# Patient Record
Sex: MALE | Race: OTHER | NOT HISPANIC OR LATINO | URBAN - METROPOLITAN AREA
[De-identification: names, ages, dates, MRNs, and addresses within clinical notes are randomized per-mention and may not be internally consistent; named-entity substitution may affect disease eponyms.]

---

## 2022-06-26 ENCOUNTER — INPATIENT (INPATIENT)
Facility: HOSPITAL | Age: 67
LOS: 2 days | Discharge: HOME | DRG: 638 | End: 2022-06-29
Attending: STUDENT IN AN ORGANIZED HEALTH CARE EDUCATION/TRAINING PROGRAM | Admitting: STUDENT IN AN ORGANIZED HEALTH CARE EDUCATION/TRAINING PROGRAM
Payer: COMMERCIAL

## 2022-06-26 VITALS
SYSTOLIC BLOOD PRESSURE: 117 MMHG | OXYGEN SATURATION: 98 % | RESPIRATION RATE: 18 BRPM | HEART RATE: 98 BPM | TEMPERATURE: 98 F | DIASTOLIC BLOOD PRESSURE: 78 MMHG

## 2022-06-26 DIAGNOSIS — E11.65 TYPE 2 DIABETES MELLITUS WITH HYPERGLYCEMIA: ICD-10-CM

## 2022-06-26 DIAGNOSIS — N17.9 ACUTE KIDNEY FAILURE, UNSPECIFIED: ICD-10-CM

## 2022-06-26 DIAGNOSIS — N18.9 CHRONIC KIDNEY DISEASE, UNSPECIFIED: ICD-10-CM

## 2022-06-26 DIAGNOSIS — Z90.79 ACQUIRED ABSENCE OF OTHER GENITAL ORGAN(S): Chronic | ICD-10-CM

## 2022-06-26 DIAGNOSIS — C61 MALIGNANT NEOPLASM OF PROSTATE: ICD-10-CM

## 2022-06-26 DIAGNOSIS — Z98.890 OTHER SPECIFIED POSTPROCEDURAL STATES: Chronic | ICD-10-CM

## 2022-06-26 DIAGNOSIS — E83.39 OTHER DISORDERS OF PHOSPHORUS METABOLISM: ICD-10-CM

## 2022-06-26 DIAGNOSIS — J30.2 OTHER SEASONAL ALLERGIC RHINITIS: ICD-10-CM

## 2022-06-26 DIAGNOSIS — Z92.3 PERSONAL HISTORY OF IRRADIATION: ICD-10-CM

## 2022-06-26 DIAGNOSIS — R74.8 ABNORMAL LEVELS OF OTHER SERUM ENZYMES: ICD-10-CM

## 2022-06-26 DIAGNOSIS — Z90.79 ACQUIRED ABSENCE OF OTHER GENITAL ORGAN(S): ICD-10-CM

## 2022-06-26 DIAGNOSIS — R63.8 OTHER SYMPTOMS AND SIGNS CONCERNING FOOD AND FLUID INTAKE: ICD-10-CM

## 2022-06-26 DIAGNOSIS — F41.9 ANXIETY DISORDER, UNSPECIFIED: ICD-10-CM

## 2022-06-26 DIAGNOSIS — Z92.21 PERSONAL HISTORY OF ANTINEOPLASTIC CHEMOTHERAPY: ICD-10-CM

## 2022-06-26 DIAGNOSIS — Z91.14 PATIENT'S OTHER NONCOMPLIANCE WITH MEDICATION REGIMEN: ICD-10-CM

## 2022-06-26 DIAGNOSIS — I12.9 HYPERTENSIVE CHRONIC KIDNEY DISEASE WITH STAGE 1 THROUGH STAGE 4 CHRONIC KIDNEY DISEASE, OR UNSPECIFIED CHRONIC KIDNEY DISEASE: ICD-10-CM

## 2022-06-26 DIAGNOSIS — D64.9 ANEMIA, UNSPECIFIED: ICD-10-CM

## 2022-06-26 DIAGNOSIS — E83.42 HYPOMAGNESEMIA: ICD-10-CM

## 2022-06-26 DIAGNOSIS — J45.909 UNSPECIFIED ASTHMA, UNCOMPLICATED: ICD-10-CM

## 2022-06-26 DIAGNOSIS — E87.1 HYPO-OSMOLALITY AND HYPONATREMIA: ICD-10-CM

## 2022-06-26 DIAGNOSIS — E11.22 TYPE 2 DIABETES MELLITUS WITH DIABETIC CHRONIC KIDNEY DISEASE: ICD-10-CM

## 2022-06-26 DIAGNOSIS — I10 ESSENTIAL (PRIMARY) HYPERTENSION: ICD-10-CM

## 2022-06-26 DIAGNOSIS — E86.0 DEHYDRATION: ICD-10-CM

## 2022-06-26 DIAGNOSIS — K60.4 RECTAL FISTULA: Chronic | ICD-10-CM

## 2022-06-26 DIAGNOSIS — Z85.46 PERSONAL HISTORY OF MALIGNANT NEOPLASM OF PROSTATE: ICD-10-CM

## 2022-06-26 LAB
A1C WITH ESTIMATED AVERAGE GLUCOSE RESULT: 18.8 % — HIGH (ref 4–5.6)
ALBUMIN SERPL ELPH-MCNC: 3.7 G/DL — SIGNIFICANT CHANGE UP (ref 3.3–5)
ALBUMIN SERPL ELPH-MCNC: 4 G/DL — SIGNIFICANT CHANGE UP (ref 3.3–5)
ALP SERPL-CCNC: 83 U/L — SIGNIFICANT CHANGE UP (ref 40–120)
ALP SERPL-CCNC: 92 U/L — SIGNIFICANT CHANGE UP (ref 40–120)
ALT FLD-CCNC: 9 U/L — LOW (ref 10–45)
ALT FLD-CCNC: 9 U/L — LOW (ref 10–45)
ANION GAP SERPL CALC-SCNC: 11 MMOL/L — SIGNIFICANT CHANGE UP (ref 5–17)
ANION GAP SERPL CALC-SCNC: 15 MMOL/L — SIGNIFICANT CHANGE UP (ref 5–17)
APPEARANCE UR: CLEAR — SIGNIFICANT CHANGE UP
AST SERPL-CCNC: 10 U/L — SIGNIFICANT CHANGE UP (ref 10–40)
AST SERPL-CCNC: 9 U/L — LOW (ref 10–40)
B-OH-BUTYR SERPL-SCNC: 1.6 MMOL/L — HIGH
BASE EXCESS BLDV CALC-SCNC: -4.5 MMOL/L — LOW (ref -2–3)
BASOPHILS # BLD AUTO: 0.03 K/UL — SIGNIFICANT CHANGE UP (ref 0–0.2)
BASOPHILS NFR BLD AUTO: 0.6 % — SIGNIFICANT CHANGE UP (ref 0–2)
BILIRUB SERPL-MCNC: 0.8 MG/DL — SIGNIFICANT CHANGE UP (ref 0.2–1.2)
BILIRUB SERPL-MCNC: 0.9 MG/DL — SIGNIFICANT CHANGE UP (ref 0.2–1.2)
BILIRUB UR-MCNC: NEGATIVE — SIGNIFICANT CHANGE UP
BUN SERPL-MCNC: 58 MG/DL — HIGH (ref 7–23)
BUN SERPL-MCNC: 65 MG/DL — HIGH (ref 7–23)
CALCIUM SERPL-MCNC: 8.7 MG/DL — SIGNIFICANT CHANGE UP (ref 8.4–10.5)
CALCIUM SERPL-MCNC: 9.4 MG/DL — SIGNIFICANT CHANGE UP (ref 8.4–10.5)
CHLORIDE SERPL-SCNC: 102 MMOL/L — SIGNIFICANT CHANGE UP (ref 96–108)
CHLORIDE SERPL-SCNC: 94 MMOL/L — LOW (ref 96–108)
CO2 BLDV-SCNC: 22.3 MMOL/L — SIGNIFICANT CHANGE UP (ref 22–26)
CO2 SERPL-SCNC: 19 MMOL/L — LOW (ref 22–31)
CO2 SERPL-SCNC: 21 MMOL/L — LOW (ref 22–31)
COLOR SPEC: YELLOW — SIGNIFICANT CHANGE UP
CREAT ?TM UR-MCNC: 51 MG/DL — SIGNIFICANT CHANGE UP
CREAT SERPL-MCNC: 2.08 MG/DL — HIGH (ref 0.5–1.3)
CREAT SERPL-MCNC: 2.3 MG/DL — HIGH (ref 0.5–1.3)
DIFF PNL FLD: NEGATIVE — SIGNIFICANT CHANGE UP
EGFR: 31 ML/MIN/1.73M2 — LOW
EGFR: 35 ML/MIN/1.73M2 — LOW
EOSINOPHIL # BLD AUTO: 0.06 K/UL — SIGNIFICANT CHANGE UP (ref 0–0.5)
EOSINOPHIL NFR BLD AUTO: 1.1 % — SIGNIFICANT CHANGE UP (ref 0–6)
ESTIMATED AVERAGE GLUCOSE: >441 MG/DL — HIGH (ref 68–114)
GLUCOSE BLDC GLUCOMTR-MCNC: 231 MG/DL — HIGH (ref 70–99)
GLUCOSE BLDC GLUCOMTR-MCNC: 234 MG/DL — HIGH (ref 70–99)
GLUCOSE BLDC GLUCOMTR-MCNC: 260 MG/DL — HIGH (ref 70–99)
GLUCOSE SERPL-MCNC: 375 MG/DL — HIGH (ref 70–99)
GLUCOSE SERPL-MCNC: 619 MG/DL — CRITICAL HIGH (ref 70–99)
GLUCOSE UR QL: >=1000
HCO3 BLDV-SCNC: 21 MMOL/L — LOW (ref 22–29)
HCT VFR BLD CALC: 32 % — LOW (ref 39–50)
HGB BLD-MCNC: 11.4 G/DL — LOW (ref 13–17)
IMM GRANULOCYTES NFR BLD AUTO: 0.6 % — SIGNIFICANT CHANGE UP (ref 0–1.5)
KETONES UR-MCNC: NEGATIVE — SIGNIFICANT CHANGE UP
LACTATE SERPL-SCNC: 1.4 MMOL/L — SIGNIFICANT CHANGE UP (ref 0.5–2)
LEUKOCYTE ESTERASE UR-ACNC: NEGATIVE — SIGNIFICANT CHANGE UP
LIDOCAIN IGE QN: 252 U/L — HIGH (ref 7–60)
LYMPHOCYTES # BLD AUTO: 0.79 K/UL — LOW (ref 1–3.3)
LYMPHOCYTES # BLD AUTO: 14.8 % — SIGNIFICANT CHANGE UP (ref 13–44)
MCHC RBC-ENTMCNC: 31.2 PG — SIGNIFICANT CHANGE UP (ref 27–34)
MCHC RBC-ENTMCNC: 35.6 GM/DL — SIGNIFICANT CHANGE UP (ref 32–36)
MCV RBC AUTO: 87.7 FL — SIGNIFICANT CHANGE UP (ref 80–100)
MONOCYTES # BLD AUTO: 0.53 K/UL — SIGNIFICANT CHANGE UP (ref 0–0.9)
MONOCYTES NFR BLD AUTO: 9.9 % — SIGNIFICANT CHANGE UP (ref 2–14)
NEUTROPHILS # BLD AUTO: 3.91 K/UL — SIGNIFICANT CHANGE UP (ref 1.8–7.4)
NEUTROPHILS NFR BLD AUTO: 73 % — SIGNIFICANT CHANGE UP (ref 43–77)
NITRITE UR-MCNC: NEGATIVE — SIGNIFICANT CHANGE UP
NRBC # BLD: 0 /100 WBCS — SIGNIFICANT CHANGE UP (ref 0–0)
OSMOLALITY SERPL: 323 MOSM/KG — HIGH (ref 280–301)
OSMOLALITY UR: 575 MOSM/KG — SIGNIFICANT CHANGE UP (ref 300–900)
PCO2 BLDV: 40 MMHG — LOW (ref 42–55)
PH BLDV: 7.33 — SIGNIFICANT CHANGE UP (ref 7.32–7.43)
PH UR: 6 — SIGNIFICANT CHANGE UP (ref 5–8)
PLATELET # BLD AUTO: 244 K/UL — SIGNIFICANT CHANGE UP (ref 150–400)
PO2 BLDV: <33 MMHG — SIGNIFICANT CHANGE UP (ref 25–45)
POTASSIUM SERPL-MCNC: 4.4 MMOL/L — SIGNIFICANT CHANGE UP (ref 3.5–5.3)
POTASSIUM SERPL-MCNC: 4.9 MMOL/L — SIGNIFICANT CHANGE UP (ref 3.5–5.3)
POTASSIUM SERPL-SCNC: 4.4 MMOL/L — SIGNIFICANT CHANGE UP (ref 3.5–5.3)
POTASSIUM SERPL-SCNC: 4.9 MMOL/L — SIGNIFICANT CHANGE UP (ref 3.5–5.3)
POTASSIUM UR-SCNC: 11 MMOL/L — SIGNIFICANT CHANGE UP
PROT SERPL-MCNC: 6.8 G/DL — SIGNIFICANT CHANGE UP (ref 6–8.3)
PROT SERPL-MCNC: 7.2 G/DL — SIGNIFICANT CHANGE UP (ref 6–8.3)
PROT UR-MCNC: NEGATIVE MG/DL — SIGNIFICANT CHANGE UP
RBC # BLD: 3.65 M/UL — LOW (ref 4.2–5.8)
RBC # FLD: 11.7 % — SIGNIFICANT CHANGE UP (ref 10.3–14.5)
SAO2 % BLDV: 53.5 % — LOW (ref 67–88)
SARS-COV-2 RNA SPEC QL NAA+PROBE: NEGATIVE — SIGNIFICANT CHANGE UP
SODIUM SERPL-SCNC: 128 MMOL/L — LOW (ref 135–145)
SODIUM SERPL-SCNC: 134 MMOL/L — LOW (ref 135–145)
SODIUM UR-SCNC: 44 MMOL/L — SIGNIFICANT CHANGE UP
SP GR SPEC: 1.01 — SIGNIFICANT CHANGE UP (ref 1–1.03)
TRIGL SERPL-MCNC: 185 MG/DL — HIGH
TROPONIN T SERPL-MCNC: 0.01 NG/ML — SIGNIFICANT CHANGE UP (ref 0–0.01)
TSH SERPL-MCNC: 1.18 UIU/ML — SIGNIFICANT CHANGE UP (ref 0.27–4.2)
UROBILINOGEN FLD QL: 0.2 E.U./DL — SIGNIFICANT CHANGE UP
WBC # BLD: 5.35 K/UL — SIGNIFICANT CHANGE UP (ref 3.8–10.5)
WBC # FLD AUTO: 5.35 K/UL — SIGNIFICANT CHANGE UP (ref 3.8–10.5)

## 2022-06-26 PROCEDURE — 71045 X-RAY EXAM CHEST 1 VIEW: CPT | Mod: 26

## 2022-06-26 PROCEDURE — 99223 1ST HOSP IP/OBS HIGH 75: CPT | Mod: GC

## 2022-06-26 PROCEDURE — 99285 EMERGENCY DEPT VISIT HI MDM: CPT | Mod: 25

## 2022-06-26 RX ORDER — INSULIN LISPRO 100/ML
8 VIAL (ML) SUBCUTANEOUS
Refills: 0 | Status: DISCONTINUED | OUTPATIENT
Start: 2022-06-26 | End: 2022-06-27

## 2022-06-26 RX ORDER — SODIUM CHLORIDE 9 MG/ML
1000 INJECTION INTRAMUSCULAR; INTRAVENOUS; SUBCUTANEOUS ONCE
Refills: 0 | Status: COMPLETED | OUTPATIENT
Start: 2022-06-26 | End: 2022-06-26

## 2022-06-26 RX ORDER — METHYLPHENIDATE HCL 5 MG
1 TABLET ORAL
Qty: 0 | Refills: 0 | DISCHARGE

## 2022-06-26 RX ORDER — DEXTROSE 50 % IN WATER 50 %
12.5 SYRINGE (ML) INTRAVENOUS ONCE
Refills: 0 | Status: DISCONTINUED | OUTPATIENT
Start: 2022-06-26 | End: 2022-06-29

## 2022-06-26 RX ORDER — INSULIN GLARGINE 100 [IU]/ML
16 INJECTION, SOLUTION SUBCUTANEOUS ONCE
Refills: 0 | Status: COMPLETED | OUTPATIENT
Start: 2022-06-26 | End: 2022-06-26

## 2022-06-26 RX ORDER — SODIUM CHLORIDE 9 MG/ML
1000 INJECTION, SOLUTION INTRAVENOUS
Refills: 0 | Status: DISCONTINUED | OUTPATIENT
Start: 2022-06-26 | End: 2022-06-28

## 2022-06-26 RX ORDER — INSULIN HUMAN 100 [IU]/ML
10 INJECTION, SOLUTION SUBCUTANEOUS ONCE
Refills: 0 | Status: COMPLETED | OUTPATIENT
Start: 2022-06-26 | End: 2022-06-26

## 2022-06-26 RX ORDER — GLUCAGON INJECTION, SOLUTION 0.5 MG/.1ML
1 INJECTION, SOLUTION SUBCUTANEOUS ONCE
Refills: 0 | Status: DISCONTINUED | OUTPATIENT
Start: 2022-06-26 | End: 2022-06-29

## 2022-06-26 RX ORDER — SODIUM CHLORIDE 9 MG/ML
1000 INJECTION, SOLUTION INTRAVENOUS
Refills: 0 | Status: DISCONTINUED | OUTPATIENT
Start: 2022-06-26 | End: 2022-06-29

## 2022-06-26 RX ORDER — ALBUTEROL 90 UG/1
3 AEROSOL, METERED ORAL
Qty: 0 | Refills: 0 | DISCHARGE

## 2022-06-26 RX ORDER — INSULIN LISPRO 100/ML
VIAL (ML) SUBCUTANEOUS
Refills: 0 | Status: DISCONTINUED | OUTPATIENT
Start: 2022-06-26 | End: 2022-06-29

## 2022-06-26 RX ORDER — DEXTROSE 50 % IN WATER 50 %
25 SYRINGE (ML) INTRAVENOUS ONCE
Refills: 0 | Status: DISCONTINUED | OUTPATIENT
Start: 2022-06-26 | End: 2022-06-29

## 2022-06-26 RX ORDER — CLONAZEPAM 1 MG
1 TABLET ORAL
Qty: 0 | Refills: 0 | DISCHARGE

## 2022-06-26 RX ORDER — SODIUM CHLORIDE 9 MG/ML
1000 INJECTION INTRAMUSCULAR; INTRAVENOUS; SUBCUTANEOUS
Refills: 0 | Status: DISCONTINUED | OUTPATIENT
Start: 2022-06-26 | End: 2022-06-26

## 2022-06-26 RX ORDER — DEXTROSE 50 % IN WATER 50 %
15 SYRINGE (ML) INTRAVENOUS ONCE
Refills: 0 | Status: DISCONTINUED | OUTPATIENT
Start: 2022-06-26 | End: 2022-06-29

## 2022-06-26 RX ADMIN — SODIUM CHLORIDE 1000 MILLILITER(S): 9 INJECTION INTRAMUSCULAR; INTRAVENOUS; SUBCUTANEOUS at 09:52

## 2022-06-26 RX ADMIN — Medication 8 UNIT(S): at 16:39

## 2022-06-26 RX ADMIN — SODIUM CHLORIDE 1000 MILLILITER(S): 9 INJECTION INTRAMUSCULAR; INTRAVENOUS; SUBCUTANEOUS at 12:32

## 2022-06-26 RX ADMIN — Medication 4: at 16:39

## 2022-06-26 RX ADMIN — SODIUM CHLORIDE 1000 MILLILITER(S): 9 INJECTION INTRAMUSCULAR; INTRAVENOUS; SUBCUTANEOUS at 09:00

## 2022-06-26 RX ADMIN — SODIUM CHLORIDE 1000 MILLILITER(S): 9 INJECTION INTRAMUSCULAR; INTRAVENOUS; SUBCUTANEOUS at 10:32

## 2022-06-26 RX ADMIN — INSULIN GLARGINE 16 UNIT(S): 100 INJECTION, SOLUTION SUBCUTANEOUS at 16:21

## 2022-06-26 RX ADMIN — SODIUM CHLORIDE 1000 MILLILITER(S): 9 INJECTION INTRAMUSCULAR; INTRAVENOUS; SUBCUTANEOUS at 08:27

## 2022-06-26 RX ADMIN — INSULIN HUMAN 10 UNIT(S): 100 INJECTION, SOLUTION SUBCUTANEOUS at 08:33

## 2022-06-26 RX ADMIN — SODIUM CHLORIDE 1000 MILLILITER(S): 9 INJECTION INTRAMUSCULAR; INTRAVENOUS; SUBCUTANEOUS at 11:00

## 2022-06-26 RX ADMIN — INSULIN HUMAN 10 UNIT(S): 100 INJECTION, SOLUTION SUBCUTANEOUS at 07:41

## 2022-06-26 RX ADMIN — INSULIN HUMAN 10 UNIT(S): 100 INJECTION, SOLUTION SUBCUTANEOUS at 10:59

## 2022-06-26 RX ADMIN — SODIUM CHLORIDE 120 MILLILITER(S): 9 INJECTION, SOLUTION INTRAVENOUS at 17:00

## 2022-06-26 RX ADMIN — SODIUM CHLORIDE 1000 MILLILITER(S): 9 INJECTION INTRAMUSCULAR; INTRAVENOUS; SUBCUTANEOUS at 08:12

## 2022-06-26 RX ADMIN — SODIUM CHLORIDE 1000 MILLILITER(S): 9 INJECTION INTRAMUSCULAR; INTRAVENOUS; SUBCUTANEOUS at 07:34

## 2022-06-26 RX ADMIN — Medication 4: at 21:36

## 2022-06-26 RX ADMIN — SODIUM CHLORIDE 200 MILLILITER(S): 9 INJECTION INTRAMUSCULAR; INTRAVENOUS; SUBCUTANEOUS at 12:38

## 2022-06-26 NOTE — H&P ADULT - PROBLEM SELECTOR PLAN 4
Presents with elevated lipase at 252. No abd pain. Do not suspect this is pancreatitis given absence of abd pain, though suspect this is elevated i/s/o his diabetes, hyperglycemia, and some element of pancreatic inflammation with poorly controlled hyperglycemia. Other causes could include reduced clearance i/s/o JUAN vs hepatobiliary disease   - AM lipid panel  - manage underlying diabetes as above  - would repeat lipase when hyperglycemia better controlled and if persistently elevated consider GI consult

## 2022-06-26 NOTE — H&P ADULT - PROBLEM SELECTOR PLAN 7
Hx of anxiety on clonazpeam 1mg bid prn, although patient reports that he takes this medication only a couple times a month.  - hold until requested    #Decreased energy  Patient reports that he has been started on ritalin 5mg bid for low energy.  He only takes this pill 2x/week.  - hold until requested

## 2022-06-26 NOTE — H&P ADULT - NSICDXPASTSURGICALHX_GEN_ALL_CORE_FT
PAST SURGICAL HISTORY:  H/O Achilles tendon repair     H/O prostatectomy     H/O umbilical hernia repair     Rectal fistula

## 2022-06-26 NOTE — H&P ADULT - PROBLEM SELECTOR PLAN 10
F: s/p 4L NS, c/w IVMF  E: replete K<4, Mg<2  N: CC, low sodium  DVT proph: SCDs  GI proph: none needed    FULL CODE F: s/p 4L NS, LR IVMF at 120cc/hr  E: replete K<4, Mg<2  N: CC, low sodium  DVT proph: SCDs  GI proph: none needed    FULL CODE

## 2022-06-26 NOTE — H&P ADULT - ATTENDING COMMENTS
66M PMH HTN, DM, asthma (no prior hospitalization or intubations), prostate cancer (s/p surgery, chemo, RT), who presents with a chief complaint of progressive weakness and found to be severely hyperglycemic to 600 and with JUAN    #Uncontrolled DM with hyperglycemia  - patient reports recent trip to [place] with worsened diet but prior to this reports sugars well controlled on home oral regimen and A1c at or near goal  - A1c 18 and glucose elevated without DKA on admit s/p regular insulin in ED  - Endo consult and recommended starting lantus 16 and premeal 8  - given rapid change in glycemic control, fam hx of pancreatic cancer will f/u CA 19-9 and CT abdomen to evaluate for pancreatic mass.  No contrast given JUAN    #JUAN  - lytes consistent with intrinsic process likely ATN due to dehydration and likely hypotension (was still on BP meds with high UOP)  - Trend Cr, avoid nephrotoxic agents  - collateral in AM for baseline  - hold home BP meds

## 2022-06-26 NOTE — H&P ADULT - PROBLEM SELECTOR PLAN 1
Hx of DM, reportedly with a1c ~7 and blood sugars  at home.  Presents after fatigue, polydipsia, polyuria, and decreased appetite with FSG >600.  On metformin-linagliptin 5mg-1000mg qd at home.  - s/p 10u regular insulin X3 and 4L NS in ED  - a1c 18.8  - endo consulted, recommending lantus 16u STAT, premeal 8u, and mISS  - add on TSH  - currently tolerating regular diet Hx of DM, reportedly with a1c ~7 and blood sugars  at home.  Presents after fatigue, polydipsia, polyuria, and decreased appetite with FSG >600.  On metformin-linagliptin 5mg-1000mg qd at home.  - s/p 10u regular insulin X3 and 4L NS in ED  - a1c 18.8  - endo consulted, recommending lantus 16u STAT, premeal 8u, and mISS  - add on TSH  - add on TAGs  - start LR IVMF at 120cc/hr  - currently tolerating regular diet  - CT A/P noncon to r/o pancreatic mass

## 2022-06-26 NOTE — PATIENT PROFILE ADULT - FALL HARM RISK - UNIVERSAL INTERVENTIONS
Bed in lowest position, wheels locked, appropriate side rails in place/Call bell, personal items and telephone in reach/Instruct patient to call for assistance before getting out of bed or chair/Non-slip footwear when patient is out of bed/[place] to call system/Physically safe environment - no spills, clutter or unnecessary equipment/Purposeful Proactive Rounding/Room/bathroom lighting operational, light cord in reach

## 2022-06-26 NOTE — PATIENT PROFILE ADULT - HOW PATIENT ADDRESSED, PROFILE
Assessment    1  Atrial fibrillation (427 31) (I48 91)   2  DMII (diabetes mellitus, type 2) (250 00) (E11 9)   3  Arteriosclerotic cardiovascular disease (429 2,440 9) (I25 10)   4  Hyperlipidemia (272 4) (E78 5)   5  Encounter for preventive health examination (V70 0) (Z00 00)    Plan  Arteriosclerotic cardiovascular disease    · Atorvastatin Calcium 40 MG Oral Tablet; Take 1 tablet daily  Atrial fibrillation    · Sotalol HCl - 80 MG Oral Tablet; 1 1/2 po bide  Diabetes    · MetFORMIN HCl  MG Oral Tablet Extended Release 24 Hour; TAKE 2  TABLETS BY MOUTH EVERY DAY   · Onglyza 5 MG Oral Tablet; Take 1 tablet daily  DMII (diabetes mellitus, type 2)    · (1) BASIC METABOLIC PROFILE; Status:Active; Requested for:[id number];    · (1) HEMOGLOBIN A1C; Status:Active; Requested for:[id number]; Health Maintenance    · Follow-up visit in 5 months Evaluation and Treatment  Follow-up  Status: Hold For -  Scheduling  Requested for: [id number]    Discussion/Summary    1  New onset atrial fibrillation  Patient is being followed closely by cardiologists outside of [place] and at this point time is heart rate is controlled as well as his rhythm  Patient does have a follow-up visit to see them to discuss the results of testing and further treatment if indicated  Patient is on oral anticoagulants  2  Diabetes mellitus type 2  Patient's hemoglobin A1c is 7 0 but patient was told that he can do much better if he would watch his diet a little bit more closely  He is hoping to restart an aerobic exercise program when she has been cleared by his cardiologist     3  Atherosclerotic cardiovascular disease  As per 1  Above patient is being seen by cardiologist and he is told he has no evidence of ischemic disease at this point time  Patient is on statins to control his cholesterol  4  Hyperlipidemia  As per 3  Above patient continues on statins  He is not compliant with diet and we stressed the importance of this      5  Health maintenance  Patient is up-to-date with all criteria including colorectal screening, routine prostate examinations, general health concerns  Again we discussed with the patient the importance of working harder on diet and weight loss  Impression: Subsequent Annual Wellness Visit  Cardiovascular screening and counseling: the risks and benefits of screening were discussed and screening is current  Diabetes screening and counseling: the risks and benefits of screening were discussed and screening is current  Colorectal cancer screening and counseling: the risks and benefits of screening were discussed and screening is current  Prostate cancer screening and counseling: the risks and benefits of screening were discussed and screening is current  Osteoporosis screening and counseling: the risks and benefits of screening were discussed and screening not indicated  Abdominal aortic aneurysm screening and counseling: the risks and benefits of screening were discussed and screening is current  Glaucoma screening and counseling: the risks and benefits of screening were discussed and screening is current  HIV screening and counseling: screening not indicated  Hepatitis C Screening: the patient was counseled on Hepatitis C screening  The patient declinesHepatitis C screening  Immunizations: the risks and benefits of influenza vaccination were discussed with the patient, influenza vaccine is up to date this year, the risks and benefits of pneumococcal vaccination were discussed with the patient, the patient declines the Zostavax vaccine, the risks and benefits of the Td vaccine were discussed with the patient and Td vaccination up to date  Advance Directive Planning: not complete, he was encouraged to follow-up with me to discuss his questions and/or decisions  Patient Discussion: plan discussed with the patient, follow-up visit needed in Five months     Possible side effects of new medications were reviewed with the patient/guardian today  The treatment plan was reviewed with the patient/guardian  The patient/guardian understands and agrees with the treatment plan      Chief Complaint  Patient is here today for an annual wellness exam      Advance Directives  Advance Directive [facility]:   NO - Patient does not have an advance health care directive  Capacity/Competence: This patient has full decision making capacity for discussion of advance care planning and This patient has full decision making competency for discussion of advance care planning  Summary of Advance Directive Conversation  He is discussed beginning the process to obtain a living will and is needing durable power   He has not accomplished this as of yet and we will discuss this with his next visit  History of Present Illness  HPI: Patient is a 70-year-old male with a history of hyperlipidemia, coronary artery disease, diabetes mellitus type 2, recent diagnosis of atrial fibrillation  Patient is here today for a Medicare wellness visit  He did have labs performed prior to being seen today and we did discuss results  Abnormalities include a fasting glucose level of 159 and hemoglobin A1c of 7 0 showing adequate control  Patient's creatinine is slightly elevated to 1 4 and this is most notably after he had cardiac catheterization which may have interfered with his renal function and this will be Re checked  His cholesterol is showing good control at 108 with an HDL of 34 and an LDL of 52  Patient continues on treatment with statins  In general patient states he has been feeling relatively well and his cardiologist are still working on controlling his heart rate and patient now is on oral anticoagulants and antiarrhythmics  He does have some shortness of breath with exertion and has not begun an exercise program until his heart rate is controlled  Denies any bowel or bladder changes, headaches, lightheadedness, dizziness   As previously noted his appetite is good and he is not watching his diet as closely as he should  Welcome to [employer] and Wellness Visits: The patient is being seen for the subsequent annual wellness visit  Co-Managers and Medical Equipment/Suppliers: See Patient Care Team   Reviewed Updated [facility]:   Last Medicare Wellness Visit Information was reviewed, patient interviewed, no change since last AW  Preventive Quality Program 65 and Older: Falls Risk: The patient fell 0 times in the past 12 months  The patient is currently asymptomatic Symptoms Include:  Associated symptoms:  No associated symptoms are reported  The patient is currently experiencing urinary symptoms  Urinary Incontinence Symptoms includes: nocturia    Date of last glaucoma screen was Patient has yearly diabetic eye exams and does have glaucoma screening with this  Review of Systems    Constitutional: negative  Head and Face: negative  Eyes: negative  ENT: negative  Cardiovascular: racing heart, but as noted in HPI, no chest pain, no palpitations, no lightheadedness and no lower extremity edema  Respiratory: shortness of breath and Shortness of breath with brisk exertion, but no wheezing, not sleeping upright or with extra pillows, no cough, no dry cough, no productive cough, no clear sputum, no colored sputum and not vomiting blood  Gastrointestinal: negative  Genitourinary: urinary hesitancy and nocturia, but no dysuria, no urinary frequency, no urinary urgency, no urinary incontinence, no hematuria, no pelvic pain and no testicular pain  Musculoskeletal: diffuse joint pain, joint stiffness and Some generalized minor arthritic aches and pains and stiffness but nothing new from previously, but no generalized muscle aches, no back pain, no joint swelling, no back muscle spasm, no pain in other joints and no limping  Integumentary and Breasts: negative  Neurological: negative  Psychiatric: negative     Endocrine: negative  Hematologic and Lymphatic: negative  Active Problems    1  Allergic rhinitis (477 9) (J30 9)   2  Arteriosclerotic cardiovascular disease (429 2,440 9) (I25 10)   3  Back pain, thoracic (724 1) (M54 6)   4  Colon cancer screening (V76 51) (Z12 11)   5  Coronary artery disease (414 00) (I25 10)   6  Dementia (294 20) (F03 90)   7  Diabetes (250 00) (E11 9)   8  DMII (diabetes mellitus, type 2) (250 00) (E11 9)   9  Encounter for preventive health examination (V70 0) (Z00 00)   10  Encounter for prostate cancer screening (V76 44) (Z12 5)   11  Hyperlipidemia (272 4) (E78 5)   12  Hypertension (401 9) (I10)   13  Localized, primary osteoarthritis of lower leg, unspecified laterality   14  Lumbago With Sciatica (724 3)   15  MCI (mild cognitive impairment) (331 83) (G31 84)   16  Memory loss or impairment (780 93) (R41 3)   17  Need for pneumococcal vaccine (V03 82) (Z23)   18  Need for prophylactic vaccination and inoculation against influenza (V04 81) (Z23)   19  Obesity (278 00) (E66 9)   20  Vitamin D deficiency (268 9) (E55 9)    Past Medical History    · Need for prophylactic vaccination and inoculation against influenza (V04 81) (Z23)    The active problems and past medical history were reviewed and updated today  Surgical History    The surgical history was reviewed and updated today  Family History  Father    · No pertinent family history    The family history was reviewed and updated today  Social History    · Former smoker (D74 36) (A12 884)   · No alcohol use  The social history was reviewed and updated today  The social history was reviewed and is unchanged  Current Meds   1  Aspirin 81 MG CAPS; Therapy: (Recorded:94Iit7388) to Recorded   2  Atorvastatin Calcium 40 MG Oral Tablet; take 1 tablet by mouth daily; Therapy: 09CFU6806 to (Evaluate:11Mar2018)  Requested for: 85SCM3151; Last   Rx:16Mar2017 Ordered   3  Daily Multiple Vitamins TABS;    Therapy: (Recorded:73Rvq3915) to Recorded   4  Eliquis 5 MG Oral Tablet; Take 1 tablet twice daily; Therapy: 49Mms6903 to (Evaluate:18Jun2018) Recorded   5  MetFORMIN HCl  MG Oral Tablet Extended Release 24 Hour; take 1 tablet by   mouth twice daily; Therapy: 48LDM8368 to (Evaluate:11Mar2018)  Requested for: 12DQJ8398; Last   Rx:16Mar2017 Ordered   6  Onglyza 5 MG Oral Tablet; ONE TABLET ONCE DAILY; Therapy: 84SWP9794 to (Evaluate:77Wna8109)  Requested for: 31UGF2457; Last   Rx:09Nov2017 Ordered    The medication list was reviewed and updated today  Allergies    1  No Known Drug Allergies    Immunizations   ** Printed in Appendix #1 below  Vitals  Signs    Temperature: 96 3 F  Heart Rate: 52  Systolic: 388  Diastolic: 62  Height: 6 ft   Weight: 226 lb   BMI Calculated: 30 65  BSA Calculated: 2 24  O2 Saturation: 99    Physical Exam    Constitutional Pleasant overweight 77-year-old male who is awake alert in no acute distress and oriented x3  Head and Face   Head and face: Normal     Palpation of the face and sinuses: No sinus tenderness  Eyes   Conjunctiva and lids: No erythema, swelling or discharge  Pupils and irises: Equal, round, reactive to light  Ophthalmoscopic examination: Normal fundi and optic discs  Ears, Nose, Mouth, and Throat   External inspection of ears and nose: Normal     Otoscopic examination: Tympanic membranes translucent with normal light reflex  Canals patent without erythema  Hearing: Normal     Nasal mucosa, septum, and turbinates: Normal without edema or erythema  Lips, teeth, and gums: Normal, good dentition  Oropharynx: Normal with no erythema, edema, exudate or lesions  Neck   Neck: Supple, symmetric, trachea midline, no masses  Thyroid: Normal, no thyromegaly  Pulmonary   Respiratory effort: No increased work of breathing or signs of respiratory distress      Percussion of chest: Normal     Palpation of chest: Normal     Auscultation of lungs: Clear to auscultation  Cardiovascular   Palpation of heart: Normal PMI, no thrills  Auscultation of heart: Normal rate and rhythm, normal S1 and S2, no murmurs  Carotid pulses: 2+ bilaterally  Abdominal aorta: Normal     Femoral pulses: 2+ bilaterally  Pedal pulses: 2+ bilaterally  Peripheral vascular exam: Normal     Examination of extremities for edema and/or varicosities: Normal     Chest   Breasts: Normal, no dimpling or skin changes appreciated  Palpation of breasts and axillae: Normal, no masses palpated  Chest: Normal     Abdomen   Abdomen: Abnormal   Obese soft nontender with positive bowel sounds  Liver and spleen: No hepatomegaly or splenomegaly  Examination for hernias: Abnormal   A ventral hernia  Anus, perineum, and rectum: Normal sphincter tone, no masses, no prolapse  Stool sample for occult blood: Negative  Genitourinary   Scrotal contents: Normal testes, no masses  Penis: Normal, no lesions  Digital rectal exam of prostate: Normal size, no masses  Lymphatic   Palpation of lymph nodes in neck: No lymphadenopathy  Palpation of lymph nodes in axillae: No lymphadenopathy  Palpation of lymph nodes in groin: No lymphadenopathy  Palpation of lymph nodes in other areas: No lymphadenopathy  Musculoskeletal   Gait and station: Normal     Inspection/palpation of digits and nails: Normal without clubbing or cyanosis  Inspection/palpation of joints, bones, and muscles: Normal     Range of motion: Normal     Stability: Normal     Muscle strength/tone: Normal     Skin   Skin and subcutaneous tissue: Normal without rashes or lesions  Palpation of skin and subcutaneous tissue: Normal turgor  Neurologic   Cranial nerves: Cranial nerves 2-12 intact  Cortical function: Normal mental status  Reflexes: 2+ and symmetric  Sensation: No sensory loss  Coordination: Normal finger to nose and heel to shin      Diabetic Foot Exam: Socks and shoes removed, Right Foot Findings: no swelling, no erythema, no dryness, no tenderness, no warmth, no maceration, no calluses, no pre-ulcers and no ulcers  The toes were normal  The sensory exam showed diminished vibratory sensation at the level of the toes, but normal position sense at the level of the toes  Socks and Shoes removed, Left Foot Findings: no swelling, no erythema, no dryness, no tenderness, no warmth, no maceration, no calluses, no pre-ulcers and no ulcers  The toes were normal  The sensory exam showed diminished vibratory sensation at the level of the toes, but normal position sense at the level of the toes  Monofilament Testing: diminished tactile sensation with monofilament testing throughout both feet  Vascular: Capillary refills findings on the right were normal in the toes  Pulses: 1+ in the posterior tibialis and 1+ in the dorsalis pedis  Capillary refills findings on the left were normal in the toes  Pulses: 1+ in the posterior tibialis and 1+ in the dorsalis pedis  Assign Risk Category: 2: Loss of protective sensation with or without weakness, deformity, callus, pre-ulcer, or history of ulceration  High risk  Psychiatric   Judgment and insight: Normal     Orientation to person, place and time: Normal     Recent and remote memory: Intact  Mood and affect: Normal        Results/Data  Falls Risk Assessment (Dx Z13 89 Screen for Neurologic Disorder) [id number] 12:21PM User, [employer]     Test Name Result Flag Reference   Falls Risk      No falls in the past year     PHQ-2 Adult Depression Screening [id number] 12:20PM User, [employer]     Test Name Result Flag Reference   PHQ-2 Adult Depression Score 0     Over the last two weeks, how often have you been bothered by any of the following problems?   Little interest or pleasure in doing things: Not at all - 0  Feeling down, depressed, or hopeless: Not at all - 0   PHQ-2 Adult Depression Screening Negative         Future Appointments    Date/Time Provider Specialty Site   2018 11:00 AM Roverto Gonsalez DO Internal Medicine [facility] INTERNAL MED     Signatures   Electronically signed by : Woodrow Barajas DO; Dec 20 2017  1:35PM EST                       (Author)    Appendix #1     Patient: Peg Palacios ; : 1941; MRN: [id number]      1 2 3 4 5 6    Influenza  20-Sep-2012 03-Sep-2013 03-Oct-2014 16-Nov-2015 02-Dec-2016 Unknown    PCV  02-Dec-2016 Matt

## 2022-06-26 NOTE — CONSULT NOTE ADULT - SUBJECTIVE AND OBJECTIVE BOX
CHINTAN MIKE    HPI:  Patient is a 66M PMH HTN, DM, asthma (no prior hospitalization or intubations), prostate cancer, who presents with a chief complaint of progressive weakness.    Pt was traveling in [place] for a few weeks 6/8-6/22, felt increasingly sluggish, SOB, fatigued. At one point he passed out when bent over. Also with fingers tingling. He came home, saw his doctor who did labwork on Friday 6/24, then called him 6/25 and recommended he go to ED. Of note, pt did not take his BP meds today bc of relative hypotension. He is adherent to his DM medications.    REVIEW OF SYSTEMS:  General: (+) chills, weakness, sluggishness per HPI; no fevers or sweats  HEENT: mild sore throat for a few weeks  CV: no chest pain or palpitations  Pulm: baseline SOB and cough productive with mild green sputum/mucous  GI: (+) RLQ spasms on/off; no abd pain, no n/v/; no diarrhea or constipation   : no dysuria or hematuria  MSK: (+) baseline muscle weakness  Endo: (+) polydipsia    PAST MEDICAL HISTORY:  -HTN (hypertension)  -Diabetes - diagnosed in 2018  -Asthma - never intubated or hospitalized  - prostate cancer - diagnosed [id number] s/p surgery, chemo, radiation  - anemia    PAST SURGICAL HISTORY:  - prostatectomy  - achilles tendon surgery  - umbilical hernia surgery  - rectal fistula surgery    MEDICATIONS:  - metformin-linagliptin  -lisniopril-HCTZ  -vitD3, b12, b1, magnesium  -albuterol inhaler    ALLERGIES:  - NKDA or food allergies    SOCIAL HISTORY:  - social drinker  - never smoker, no drugs  - independent at baseline  - works as pyschiatrist    FAMILY HISTORY:  - deferred      T(C): 36.7 (06-26-22 @ 12:25), Max: 36.7 (06-26-22 @ 12:25)  HR: 72 (06-26-22 @ 12:25) (72 - 98)  BP: 117/69 (06-26-22 @ 12:25) (115/70 - 117/78)  RR: 16 (06-26-22 @ 12:25) (16 - 18)  SpO2: 99% (06-26-22 @ 12:25) (98% - 99%)  Wt(kg): --Vital Signs Last 24 Hrs  T(C): 36.7 (26 Jun 2022 12:25), Max: 36.7 (26 Jun 2022 12:25)  T(F): 98 (26 Jun 2022 12:25), Max: 98 (26 Jun 2022 12:25)  HR: 72 (26 Jun 2022 12:25) (72 - 98)  BP: 117/69 (26 Jun 2022 12:25) (115/70 - 117/78)  BP(mean): --  RR: 16 (26 Jun 2022 12:25) (16 - 18)  SpO2: 99% (26 Jun 2022 12:25) (98% - 99%)    PHYSICAL EXAM:  GENERAL: NAD, well-developed  HEENT:  Atraumatic, Normocephalic, EOMI, conjunctiva and sclera clear; MMM   NERVOUS SYSTEM:  Alert & Oriented X3, moves all extremities, biceps 5/5 strength   CHEST/LUNG: CTAB  HEART: Regular rate and rhythm; No murmurs, rubs, or gallops appreciated  GI: Soft, Nontender, Nondistended   EXTREMITIES:  no appreciable edema  SKIN: No rashes or lesions noted    LABS:  CBC   06-26-22 @ 07:32  Hematcorit 32.0  Hemoglobin 11.4  Mean Cell Hemoglobin 31.2  Platelet Count-Automated 244  RBC Count 3.65  Red Cell Distrib Width 11.7  Wbc Count 5.35      BMP  06-26-22 @ 08:18  Anion Gap. Serum 11  Blood Urea Nitrogen,Serm 58  Calcium, Total Serum 8.7  Carbon Dioxide, Serum 21  Chloride, Serum 102  Creatinine, Serum 2.08  eGFR in  --  eGFR in Non Afican American --  Gloucose, serum 375  Potassium, Serum 4.4  Sodium, Serum 134    06-26-22 @ 07:32  Anion Gap. Serum 15  Blood Urea Nitrogen,Serm 65  Calcium, Total Serum 9.4  Carbon Dioxide, Serum 19  Chloride, Serum 94  Creatinine, Serum 2.30  eGFR in  --  eGFR in Non Afican American --  Gloucose, serum 619  Potassium, Serum 4.9  Sodium, Serum 128    CMP  06-26-22 @ 08:18  Lana Aminotransferase(ALT/SGPT)9  Albumin, Serum 3.7  Alkaline Phosphatase, Serum 83  Anion Gap, Serum 11  Aspartate Aminotransferase (AST/SGOT)10  Bilirubin Total, Serum 0.8  Blood Urea Nitrogen, Serum 58  Calcium,Total Serum 8.7  Carbon Dioxide, Serum 21  Chloride, Serum 102  Creatinine, Serum 2.08  eGFR if  --  eGFR if Non African American --  Glucose, Serum 375  Potassium, Serum 4.4  Protein Total, Serum 6.8  Sodium, Serum 134                      06-26-22 @ 07:32  Lana Aminotransferase(ALT/SGPT)9  Albumin, Serum 4.0  Alkaline Phosphatase, Serum 92  Anion Gap, Serum 15  Aspartate Aminotransferase (AST/SGOT)9  Bilirubin Total, Serum 0.9  Blood Urea Nitrogen, Serum 65  Calcium,Total Serum 9.4  Carbon Dioxide, Serum 19  Chloride, Serum 94  Creatinine, Serum 2.30  eGFR if  --  eGFR if Non African American --  Glucose, Serum 619  Potassium, Serum 4.9  Protein Total, Serum 7.2  Sodium, Serum 128                          PT/INR      Amylase/Lipase  06-26-22 @ 07:32  Amylase, Serum Total --  Lipase, Serum 252            RADIOLOGY & ADDITIONAL TESTS:    Imaging Personally Reviewed:  [ ] YES  [ ] NO

## 2022-06-26 NOTE — H&P ADULT - PROBLEM SELECTOR PLAN 8
Hx of asthma on albuterol prn.  Patient reports only using the medication a couple of times per month.  - restart if sob/wheezing

## 2022-06-26 NOTE — H&P ADULT - PROBLEM SELECTOR PLAN 9
Hx of seasonal allergies on hydroxyzine hydrochloride 10mg bid prn.  Currently not endorsing any symptoms.  - restart upon request

## 2022-06-26 NOTE — ED PROVIDER NOTE - CLINICAL SUMMARY MEDICAL DECISION MAKING FREE TEXT BOX
pt sent in by pmd for abnormal outpatient labs -- pmh htn, dm, asthma, prostate ca, has not been feeling well x few wks -- mostly c/o weakness and feeling lightheaded, + polyuria/polydipsia, states that had syncopal episode last wk and felt winded - no symptoms at this time, pt well appearing, hemodynamically stable, labs w/hyponatremia, hyperglycemia and blade, no acidosis, no gap, pt given ivf and iv insulin, micu consulted for tele admission given need for close monitoring pt sent in by pmd for abnormal outpatient labs -- pmh htn, dm, asthma, prostate ca, has not been feeling well x few wks -- mostly c/o weakness and feeling lightheaded, + polyuria/polydipsia, states that had syncopal episode last wk and felt winded - no symptoms at this time, pt well appearing, hemodynamically stable, labs w/hyponatremia, hyperglycemia and blade, no acidosis, no gap, pt given ivf and iv insulin, micu consulted for tele admission given need for close monitoring but pt deemed stable for RMF

## 2022-06-26 NOTE — ED PROVIDER NOTE - ATTENDING APP SHARED VISIT CONTRIBUTION OF CARE
65 yo male h/o htn, dm, prostate ca, asthma c/o feeling unwell x wks, found to have high glu, low na, elevated cr on outpt labs, sent for eval. Pt c/o gen weakness, lightheaded, + polyuria, + polydipsia, states that passed out a wk ago, felt "winded" while walking last wk. No fevers, chills, cp, palpitations, abd pain, n/v/d, dysuria.  Well appearing, nad, nc/at, lung cta, heart reg, abd soft, nt, ext no gross deformity, no gross neuro deficits   Pt w abnl outpt labs concerning for hyperglycemia - ? dka.  Plan labs, ivf, insulin prn; likely admit .

## 2022-06-26 NOTE — H&P ADULT - NSICDXPASTMEDICALHX_GEN_ALL_CORE_FT
PAST MEDICAL HISTORY:  Anemia     Asthma     Diabetes diagnosed 2018    H/O: HTN (hypertension)     Prostate cancer diagnosed [id number] s/p surgery, chemo, RT     PAST MEDICAL HISTORY:  Asthma     Diabetes diagnosed 2018    H/O: HTN (hypertension)     Prostate cancer diagnosed [id number] s/p surgery, chemo, RT

## 2022-06-26 NOTE — H&P ADULT - PROBLEM SELECTOR PLAN 2
Presents with Cr 2.30 (unclear baseline).  Perhaps component of dehydration and CKD 2/2 DM.  Downtrending with fluids.  - FeNa showing intrisic JUAN  - c/w fluids, monitor BMP  - hold home HCTZ/Lisinopil, restart as tolerated  - avoid nephrotoxic meds  - renally dose meds

## 2022-06-26 NOTE — CONSULT NOTE ADULT - ASSESSMENT
66M PMH HTN, DM, asthma (no prior hospitalization or intubations), prostate cancer, who presents with a chief complaint of progressive weakness. ICU consulted for multiple lab abnormalities including hyperglycemia, hyponatremia    #Hyperglycemia  Glucose 619 on admission. Symptoms of weakness, fatigue, polydipsia suspected 2/2 diabetes. Elevated BHB but no anion gap to suggest DKA.  - add on a1c  - would start weight based lantus and premeal regimen with insulin sliding scale correcitonal  - would give 3rd L NS and start maintenance IVF with NS as bridge to full PO diet    #Hyponatremia  HypoNa to 128 with elevated serum osm suggests pseudohypoNa 2/2 hyperglycemia.   - would treat underlying hyperglycemia as above    #JUAN  Unknown baseline Cr, BUN/Cr 6.5/2.3 on admission.   FENa 1.3% suggests intrinsic JUAN, but patient is on HCTZ, thus would also send Urine urea and calculate FEurea  - Urine urea and f/u FEUrea  - treat underlying hyperglycemia/dehydration as above  - hold home lisinopril-HCTZ    #Anemia  Normocytic anemia Hgb 11.4 MCV 87.78 with unknown baseline. Per pt has history of anemia.   - send ferritin, iron studies, retic count    #Elevated lipase  Lipase 252. No abd pain. Do not suspect this is pancreatitis given absence of abd pain, though suspect this is elevated i/s/o his diabetes, hyperglycemia, and some element of pancreatic inflammation with poorly controlled hyperglycemia. Other causes could include reduced clearance i/s/o JUAN vs hepatobiliary disease   - check triglyceride level and cholesterol level   - manage underlying diabetes as above  - would repeat lipase when hyperglycemia better controlled and if persistently elevated consider GI consult    OK for admission to [facility] at this time.  Rest of plan per primary team.  Recommendations considered preliminary until attested by attending

## 2022-06-26 NOTE — ED PROVIDER NOTE - NS ED ATTENDING STATEMENT MOD
This was a shared visit with the SABA. I reviewed and verified the documentation and independently performed the documented:

## 2022-06-26 NOTE — H&P ADULT - HISTORY OF PRESENT ILLNESS
Patient is a 66M PMH HTN, DM, asthma (no prior hospitalization or intubations), prostate cancer, who presents with a chief complaint of progressive weakness.    Pt was traveling in [place] for a few weeks 6/8-6/22, felt increasingly sluggish, SOB, fatigued. At one point he passed out when bent over. Also with fingers tingling. He came home, saw his doctor who did labwork on Friday 6/24, then called him 6/25 and recommended he go to ED. Of note, pt did not take his BP meds today bc of relative hypotension. He is adherent to his DM medications.    In the ED:  Initial vital signs: T: 97.9 F, HR: 98, BP: 117/78, R: 18, SpO2: 98% on RA  ED course:   Labs: significant for Hgb 11.4, Na 128 (corrected 136), Cl 94, HCO3 19, BUN/Cr 65/2.30, glucose 619, BHB 1.6, lipase 252, serum osm 323, UA + >1000 glucose  Imaging:  CXR: no acute infiltrates  EKG: NSR  Medications: 10u regular insulin X3, 4L NS  Consults: none  Patient is a 66M PMH HTN, DM, asthma (no prior hospitalization or intubations), prostate cancer (s/p prostatectomy, RT, and chemo), who presents c/o weakness.  He states that he was traveling in [place] for a few weeks 6/8-6/22, felt increasingly sluggish, SOB, fatigued, particularly while hiking (he never had issues before, denies CHF hx).  He also reports having to use diapers because he was urinating so frequently and drinking a lot.  He had decreased appetite as well and mild RLQ episodic, cramping along with some loose stools (nonbloody).  Upon his return to the [facility], he contacted his PCP who ran some bloodwork and he was notified 1 day ago to come to the ED.  Of note, pt did not take his BP meds today bc of relative hypotension. He is adherent to his DM medications.    In the ED:  Initial vital signs: T: 97.9 F, HR: 98, BP: 117/78, R: 18, SpO2: 98% on RA  ED course:   Labs: significant for Hgb 11.4, Na 128 (corrected 136), Cl 94, HCO3 19, BUN/Cr 65/2.30, glucose 619, BHB 1.6, lipase 252, serum osm 323, UA + >1000 glucose  Imaging:  CXR: no acute infiltrates  EKG: NSR  Medications: 10u regular insulin X3, 4L NS  Consults: none  Patient is a 66M PMH HTN, DM, asthma (no prior hospitalization or intubations), prostate cancer (s/p prostatectomy, RT, and chemo), who presents c/o weakness.  He states that he was traveling in [place] for a few weeks 6/8-6/22, felt increasingly sluggish, SOB, fatigued, particularly while hiking (he never had issues before, denies CHF hx).  He also reports having to use diapers because he was urinating so frequently and drinking a lot.  He had decreased appetite as well and mild RLQ episodic, cramping along with some loose stools (nonbloody).  Upon his return to the [facility], he contacted his PCP who ran some bloodwork and he was notified 1 day ago to come to the ED.  Patient reports that he watches his weight and that his a1c is normally ~7.  He was not checking his blood sugar while on his trip but reports that he usually checks it 2x/week and it ranges from .  He is compliant with his home medications.  Of note, pt did not take his BP meds today bc of relative hypotension. He is adherent to his DM medications.     In the ED:  Initial vital signs: T: 97.9 F, HR: 98, BP: 117/78, R: 18, SpO2: 98% on RA  ED course:   Labs: significant for Hgb 11.4, Na 128 (corrected 136), Cl 94, HCO3 19, BUN/Cr 65/2.30, glucose 619, BHB 1.6, lipase 252, serum osm 323, UA + >1000 glucose  Imaging:  CXR: no acute infiltrates  EKG: NSR  Medications: 10u regular insulin X3, 4L NS  Consults: none

## 2022-06-26 NOTE — H&P ADULT - PROBLEM SELECTOR PLAN 3
Presents with Hgb ~11 w/o known hx of anemia.  No signs/symptoms of bleeding.  - f/u AM iron studies

## 2022-06-26 NOTE — H&P ADULT - PROBLEM SELECTOR PLAN 5
Hx of HTN on lisinopril-hctz 20-25mg qd.  - hold i/s/o low BPs and poor renal function, restart as tolerated

## 2022-06-26 NOTE — ED PROVIDER NOTE - OBJECTIVE STATEMENT
The pt is a 65 y/o M, who presents to ED c/o not feeling well x few wks - went to PMD and got blood work done 2 d ago, was called yest and told that needs to go to the hosp. Pt states that has been feeling weak, lightheaded, + polyuria, + polydipsia, states that passed out a wk ago, felt "winded" while walking last wk. PMH HTN, DM, prostate ca (2019). Denies fevers, chills, cp, palpitations, n/v/d, abd pain, flank pain.

## 2022-06-26 NOTE — ED PROVIDER NOTE - CARE PLAN
Principal Discharge DX:	Hyperglycemia  Secondary Diagnosis:	JUAN (acute kidney injury)  Secondary Diagnosis:	Hyponatremia   1

## 2022-06-26 NOTE — ED PROVIDER NOTE - MUSCULOSKELETAL, MLM
Date of Service: 10/06/20      CHIEF COMPLAINT:  Uncontrolled type 2 diabetes.  Goiter, hashimoto thyroiditis     HISTORY OF PRESENT ILLNESS:  This 75 year old [place] lady is here for reevaluation.     has passed away , she has been depressed, on sertraline, she has an ER visit once a month for various reasons, had a fall on 9/8/2020 with no injuries, head CT scan was reviewed , no abnormality, her dizziness has worsened, she is using a cane   Was on prednisone for few days for gout in august 2020       She was diagnosed with type 2 diabetes many years ago,       on janumet 50/1000 mg twice a day and glimepiride 2 mg tablets, taking one tablet in the morning only, she is not eating dinner consistently     A1c today is 6.8%, BGM 2/d, average is 124, fasting BGs are averaging 130   Due for eye exam, scheduled on 10/23/2020, no known retinopathy    No paraesthesia   On vit B 12 injections monthly      no excessive thirst or urination, no UTI, vaginal infection, cough, CP       Had left knee replacement 6/6/16.       She does have paresthesia in her feet. With worsening lately  No history of foot ulcers or surgeries.    Pt was found to have goiter and TPO were positive, started on levothyroxine 50 mcg that she takes appropriate, no compression symptoms         REVIEW OF SYSTEMS:  Vertigo   Constipation, better   Right shoulder pain    No nausea, vomiting, abdominal pain, diarrhea    No difficulty swallowing or choking   No fever, no chills, no rash   No chest pain, SOB, or pain in legs  No headache, no change in vision      MEDICATIONS:  Reviewed     ALLERGIES:  No known drug allergies.    PAST MEDICAL HISTORY:  Hyperlipidemia, type 2 diabetes.    FAMILY HISTORY:  Positive for type 2 diabetes.  Positive for hypothyroidism in her daughter.    SOCIAL HISTORY:  Does not smoke or drink alcohol.  Lives with family.    PHYSICAL EXAMINATION:  Visit Vitals  /59   Pulse 72   Ht 5' 5\" (1.651 m)   Wt 76.4 kg    BMI 28.03 kg/m²     Was 78.3, 79.5, 81.3, 79.3 kg, 77 kg last visit   GENERAL:  The patient is not in any distress, responding to questions appropriately.  Oriented to time, place and person.  Has good eye contact.  SKIN:  Warm, dry, no rash.  HEENT:  Normal conjunctivae.  No lid lag or exophthalmus.  Extraocular muscles are intact.  Oral mucosa is moist.  No ulcers.  NECK:  Supple.  Thyroid is diffusely enlarged, nodular, firm in consistency.  No lymph nodes in the cervical or supraclavicular area bilaterally.  CHEST:  Clear to auscultation bilaterally.  Normal respiratory effort.  HEART:  Normal S1, S2, no murmur, no carotid bruit bilat, no swelling in both legs.  Gait is normal, using a cane   Diabetic Foot Exam Documentation     Normal Bilateral Foot Exam:  Skin integrity is normal. Dorsalis pedis and posterior tibial pulses are present.  Pressure sensation using the [place] monofilament is present.       LABORATORY:  Creatinine (mg/dL)   Date Value   07/10/2020 0.90   12/31/2019 1.00 (H)     CALCULATED LDL (mg/dL)   Date Value   08/26/2019 48     LDL (mg/dL)   Date Value   07/10/2020 73     TSH (mcUnits/mL)   Date Value   07/10/2020 1.833   04/14/2020 0.960   08/26/2019 1.381     Hemoglobin A1C   Date Value   07/10/2020 6.7 % (H)   08/26/2019 7.1     Labs in care every where on 9/8 and 9/13/2020 were reviewed     ASSESSMENT AND PLAN:   1.  Uncontrolled type 2 diabetes. With hyperglycemia   Continue DM diet    continue  Janumet    take glimepiride 2 mg before breakfast and  1 mg before dinner, skip if not eating dinner , check BG BID ,       2.  Hypothyroidism  and goiter.    Continue levothyroxine 50 mcg, repeat TSH with next blood draw      3.  Hyperlipidemia.  Continue lipitor ,  CALCULATED LDL (mg/dL)   Date Value   08/26/2019 48     LDL (mg/dL)   Date Value   07/10/2020 73   continue lisinopril      On iron and B12 for anemia     Falls and vertigo, recommend reevaluation by PT , she will discuss  with PCP     Michael Sosa MD     Spine appears normal, range of motion is not limited, no muscle or joint tenderness

## 2022-06-26 NOTE — H&P ADULT - NSHPSOCIALHISTORY_GEN_ALL_CORE
Occupation: psychiatrist  Living situation:  Tobacco: denies  Alcohol: socially  Illicit drug use: denies

## 2022-06-26 NOTE — H&P ADULT - NSHPPHYSICALEXAM_GEN_ALL_CORE
LOS:     VITALS:   T(C): 36.7 (06-26-22 @ 12:25), Max: 36.7 (06-26-22 @ 12:25)  HR: 72 (06-26-22 @ 12:25) (72 - 98)  BP: 117/69 (06-26-22 @ 12:25) (115/70 - 117/78)  RR: 16 (06-26-22 @ 12:25) (16 - 18)  SpO2: 99% (06-26-22 @ 12:25) (98% - 99%)    GENERAL: NAD, lying in bed comfortably  HEAD:  Atraumatic, Normocephalic  EYES: EOMI, PERRLA, conjunctiva and sclera clear  ENT: Moist mucous membranes  NECK: Supple, No JVD  CHEST/LUNG: Clear to auscultation bilaterally; No rales, rhonchi, wheezing, or rubs. Unlabored respirations  HEART: Regular rate and rhythm; No murmurs, rubs, or gallops  ABDOMEN: BSx4; Soft, nontender, nondistended  EXTREMITIES:  2+ Peripheral Pulses, brisk capillary refill. No clubbing, cyanosis, or edema  NERVOUS SYSTEM:  A&Ox3, no focal deficits   SKIN: No rashes or lesions LOS:     VITALS:   T(C): 36.7 (06-26-22 @ 12:25), Max: 36.7 (06-26-22 @ 12:25)  HR: 72 (06-26-22 @ 12:25) (72 - 98)  BP: 117/69 (06-26-22 @ 12:25) (115/70 - 117/78)  RR: 16 (06-26-22 @ 12:25) (16 - 18)  SpO2: 99% (06-26-22 @ 12:25) (98% - 99%)    GENERAL: NAD, lying in bed comfortably  HEAD:  Atraumatic, Normocephalic  EYES: EOMI, PERRLA, conjunctiva and sclera clear  ENT: Moist mucous membranes  NECK: Supple  CHEST/LUNG: Clear to auscultation bilaterally; No rales, rhonchi, wheezing, or rubs. Unlabored respirations  HEART: Regular rate and rhythm; No murmurs  ABDOMEN: BSx4; Soft, nontender, nondistended  EXTREMITIES:  2+ Peripheral Pulses, brisk capillary refill. No edema  NERVOUS SYSTEM:  A&Ox3, no focal deficits   SKIN: No rashes or lesions

## 2022-06-26 NOTE — H&P ADULT - ASSESSMENT
66M PMH HTN, DM, asthma (no prior hospitalization or intubations), prostate cancer (s/p surgery, chemo, RT), who presents with a chief complaint of progressive weakness and found to be severely hyperglycemic although not in DKA. 66M PMH HTN, DM, asthma (no prior hospitalization or intubations), prostate cancer (s/p surgery, chemo, RT), who presents with a chief complaint of progressive weakness and found to be severely hyperglycemic to 600s.

## 2022-06-26 NOTE — PATIENT PROFILE ADULT - HISTORY OF COVID-19 VACCINATION
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                  Patient's Name: MARINE MIRANDA   TO WHOM IT MAY CONCERN:   The above-named person:   Has received treatment at this office on the following dates: 11/1/2018.   Has been ill or injured and unable to work from 11/1/2018 Thru 11/7/2018.   May resume work on 11/8/2018.   Medical information is confidential and cannot be disclosed without the written consent of the patient or his/her representative.      Signatures   Electronically signed by : JOSÉ MIGUEL BURLESON; Nov 1 2018  5:40PM CST    
Yes

## 2022-06-26 NOTE — PATIENT PROFILE ADULT - FUNCTIONAL SCREEN CURRENT LEVEL: COMMUNICATION, MLM
[facility] Internal Medicine   Progress Note        Subjective:    Feels ok. Desats w activity      I/O's    Intake/Output Summary (Last 24 hours) at 4/29/2021 1816  Last data filed at 4/29/2021 1017  Gross per 24 hour   Intake 486 ml   Output --   Net 486 ml           ALLERGIES:  Pork allergy   (food or med)     Hospital Meds  Current Facility-Administered Medications   Medication Dose Route Frequency Provider Last Rate Last Admin   • dexamethasone (DECADRON) tablet 6 mg  6 mg Oral Daily with breakfast Javier Fink MD   6 mg at 04/29/21 0837   • remdesivir 100 mg in sodium chloride 0.9 % 250 mL total volume infusion  100 mg Intravenous Daily John Purvis MD   Stopped at 04/29/21 1017   • hydrALAZINE (APRESOLINE) tablet 25 mg  25 mg Oral Q4H PRN Lucrecia Martinez MD   25 mg at 04/26/21 0146   • calcium carbonate (TUMS) chewable tablet 1,000 mg  1,000 mg Oral Q4H PRN Richardson Gorman MD   1,000 mg at 04/28/21 2055   • melatonin tablet 6 mg  6 mg Oral Nightly Talisha Guzman MD   6 mg at 04/28/21 2056   • enoxaparin (LOVENOX) injection 40 mg  40 mg Subcutaneous 2 times per day Talisha Guzman MD   40 mg at 04/29/21 0838   • sodium chloride 0.9% infusion   Intravenous Continuous PRN John Purvis MD       • acyclovir (ZOVIRAX) tablet 400 mg  400 mg Oral 2 times per day John Purvis MD   400 mg at 04/29/21 0837   • guaiFENesin-DM) (ROBITUSSIN DM) 100-10 MG/5ML syrup 10 mL  10 mL Oral Q4H PRN rFank West CNP   10 mL at 04/25/21 0500   • albuterol inhaler 2 puff  2 puff Inhalation Q6H Resp PRN Frank West CNP       • acetaminophen (TYLENOL) tablet 1,000 mg  1,000 mg Oral Q6H PRN Frank West CNP   1,000 mg at 04/25/21 0500   • acetaminophen (TYLENOL) tablet 650 mg  650 mg Oral Once Naveed Cuenca MD       • famotidine (PEPCID) tablet 40 mg  40 mg Oral Daily Ying Boothe CNP   40 mg at 04/29/21 0837   • lisinopril (ZESTRIL) tablet 40 mg  40 mg Oral Daily Ying Boothe CNP   40 mg  at 04/29/21 0611   • metoPROLOL tartrate (LOPRESSOR) tablet 25 mg  25 mg Oral 2 times per day Ying M PITO Boothe   25 mg at 04/29/21 0837   • amLODIPine (NORVASC) tablet 10 mg  10 mg Oral Daily Ying CALDERON Steagerman, CNP   10 mg at 04/29/21 0837        Last Recorded Vitals      SpO2 Readings from Last 3 Encounters:   04/29/21 95%        VITAL SIGNS:     Vital Last Value 24 Hour Range   Temperature 98.4 °F (36.9 °C) (04/29/21 1400) Temp  Min: 98.1 °F (36.7 °C)  Max: 98.4 °F (36.9 °C)   Pulse 91 (04/29/21 1400) Pulse  Min: 76  Max: 91   Respiratory 18 (04/29/21 0830) Resp  Min: 16  Max: 18   Non-Invasive  Blood Pressure 136/89 (04/29/21 1400) BP  Min: 131/83  Max: 157/102   Pulse Oximetry 95 % (04/29/21 1400) SpO2  Min: 95 %  Max: 97 %     Vital Today Admitted   Weight (!) 136.4 kg (300 lb 9.6 oz) (04/22/21 2110) Weight: 134.3 kg (296 lb 1.2 oz) (04/22/21 0855)   Height N/A Height: 6' (182.9 cm) (04/22/21 2110)   BMI N/A BMI (Calculated): 40.77 (04/22/21 2110)       Physical Exam:  General: Alert and oriented X3, no acute distress, comfortable, sitting up in bed  Eyes: no scleral icterus, no conjunctival erythema , EOMI, PERRLA  Cardio: RRR, S1, S2, no murmur, rub, gallop or thrills noted, No chest wall tenderness  Pulm: Lungs diminished bilaterally,  GI: obese Soft, non-tender, nondistended. Bowel sounds present.  : No suprapubic tenderness, no CVA tenderness bilaterally  Ext: No upper or lower extremity edema. Acyanotic, warm to touch, pulses palpable.    Musculoskeletal: . Moves all 4 extremities. No joint tenderness or effusions.  Skin: Warm, dry, intact, no rashes, no open wounds.   Psych: Appropriate mood and affect. Speech clear and appropriate.  Neuro: CN 2-12 grossly in tact, No focal neurologic deficits. 5/5 strength both upper and lower extremities. Speech clear and coherent.       Labs     Recent Labs   Lab 04/28/21  0531 04/27/21  0601 04/26/21  0712   SODIUM 139 140 139   POTASSIUM 4.1 4.1 3.7    CHLORIDE 100 102 103   CO2 32 31 31   BUN 17 17 17   CREATININE 0.78 0.82 0.82   GLUCOSE 141* 109* 97   ALBUMIN 3.1* 2.8* 2.8*   AST 21 27 29   BILIRUBIN 0.8 0.6 0.7       Recent Labs   Lab 04/28/21  0531 04/27/21  0601 04/26/21  0712   WBC 10.6 9.9 8.2   RBC 5.89 5.57 5.42   HGB 17.0 16.1 15.8   HCT 50.0 46.6 45.8    367 341           Imaging    XR CHEST PA OR AP 1 VIEW   Final Result   FINDINGS/IMPRESSION:      There are residual bilateral upper to mid lung and basilar infiltrates,   which appears similar to the findings observed on the previous examination   of 04/21/2021.      There are degenerative changes of the thoracic spine and bilateral   acromioclavicular joints.  The heart size is normal.  There is tortuosity   of the great vessels.      There is no demonstrable pneumothorax.      Electronically Signed by: ARIE HUTTON MD    Signed on: 4/23/2021 8:49 AM          XR CHEST PA OR AP 1 VIEW   Final Result    The lung via was with diffuse infiltrates noted throughout both lung   fields.  Moderate cardiomegaly.      Electronically Signed by: ERON BYNUM DO    Signed on: 4/21/2021 7:20 PM              Cultures  Microbiology Results     None             Assessment/Plan:    46 year old male with past medical history of HTN, Morbid obesity who comes in for fever of 5 days fever at home 104 F. No other associated symptoms except Diarrhea which started few days earlier. His family member/ SON yesterday brought up to his attention that his finger tips are bluish hence the presentation to ED.   He reports cough non productive and shortness of breath no other symptoms.. all other systems reviewed and negative except what mentioned in           Acute Hypoxemic Respiratory failure   2/2 to COVID -19 pneumonia with Superimposed bacterial S Pneumo   - continue to trend inflammatory markers (downtrending)  - O2 supplement - titrate to maintain goal SpO2 > 93%  - S/p Toci 4/23, s/p CCP x 2 4/23, on remdesivir  X 10 days, on decadron IV 10mg  - ID/pulm  on consult - continued recs appreciated  - completed Ceftriaxone/Azithromycin  - now on 2 L NC at rest  - O2 test 4/28  w sats 89% on 5 L w activity  -IS, Mobilize  -Recheck walk test tomorrow    HTN   - BP not optimal  - max'd on amlodipine and lisinopril  - unable to increase 25 metoprolol BID due to bradycardia (HR 50-60 early this AM)  - Pt diastolic high would benefit from hydrochlorothiazide and satarting 25 mg daily     Morbid obesity   Body mass index is 40.77 kg/m².  Wt loss counseling        Code Status: Full Resuscitation         Primary Care Physician  MD Huong Chopra MD [facility]  4/29/2021 6:16 PM         0 = understands/communicates without difficulty

## 2022-06-27 DIAGNOSIS — E83.42 HYPOMAGNESEMIA: ICD-10-CM

## 2022-06-27 DIAGNOSIS — E11.65 TYPE 2 DIABETES MELLITUS WITH HYPERGLYCEMIA: ICD-10-CM

## 2022-06-27 DIAGNOSIS — E83.39 OTHER DISORDERS OF PHOSPHORUS METABOLISM: ICD-10-CM

## 2022-06-27 LAB
ANION GAP SERPL CALC-SCNC: 12 MMOL/L — SIGNIFICANT CHANGE UP (ref 5–17)
BLD GP AB SCN SERPL QL: NEGATIVE — SIGNIFICANT CHANGE UP
BLD GP AB SCN SERPL QL: NEGATIVE — SIGNIFICANT CHANGE UP
BUN SERPL-MCNC: 34 MG/DL — HIGH (ref 7–23)
CALCIUM SERPL-MCNC: 7.9 MG/DL — LOW (ref 8.4–10.5)
CANCER AG19-9 SERPL-ACNC: 21 U/ML — SIGNIFICANT CHANGE UP
CHLORIDE SERPL-SCNC: 107 MMOL/L — SIGNIFICANT CHANGE UP (ref 96–108)
CHOLEST SERPL-MCNC: 112 MG/DL — SIGNIFICANT CHANGE UP
CO2 SERPL-SCNC: 19 MMOL/L — LOW (ref 22–31)
CREAT SERPL-MCNC: 1.53 MG/DL — HIGH (ref 0.5–1.3)
EGFR: 50 ML/MIN/1.73M2 — LOW
FERRITIN SERPL-MCNC: 748 NG/ML — HIGH (ref 30–400)
GLUCOSE BLDC GLUCOMTR-MCNC: 169 MG/DL — HIGH (ref 70–99)
GLUCOSE BLDC GLUCOMTR-MCNC: 270 MG/DL — HIGH (ref 70–99)
GLUCOSE BLDC GLUCOMTR-MCNC: 279 MG/DL — HIGH (ref 70–99)
GLUCOSE BLDC GLUCOMTR-MCNC: 312 MG/DL — HIGH (ref 70–99)
GLUCOSE SERPL-MCNC: 273 MG/DL — HIGH (ref 70–99)
HCT VFR BLD CALC: 26.9 % — LOW (ref 39–50)
HCV AB S/CO SERPL IA: 0.04 S/CO — SIGNIFICANT CHANGE UP
HCV AB SERPL-IMP: SIGNIFICANT CHANGE UP
HDLC SERPL-MCNC: 29 MG/DL — LOW
HGB BLD-MCNC: 9.2 G/DL — LOW (ref 13–17)
IRON SATN MFR SERPL: 38 % — SIGNIFICANT CHANGE UP (ref 16–55)
IRON SATN MFR SERPL: 68 UG/DL — SIGNIFICANT CHANGE UP (ref 45–165)
LIPID PNL WITH DIRECT LDL SERPL: 58 MG/DL — SIGNIFICANT CHANGE UP
MAGNESIUM SERPL-MCNC: 1.3 MG/DL — LOW (ref 1.6–2.6)
MCHC RBC-ENTMCNC: 30.8 PG — SIGNIFICANT CHANGE UP (ref 27–34)
MCHC RBC-ENTMCNC: 34.2 GM/DL — SIGNIFICANT CHANGE UP (ref 32–36)
MCV RBC AUTO: 90 FL — SIGNIFICANT CHANGE UP (ref 80–100)
NON HDL CHOLESTEROL: 83 MG/DL — SIGNIFICANT CHANGE UP
NRBC # BLD: 0 /100 WBCS — SIGNIFICANT CHANGE UP (ref 0–0)
PHOSPHATE SERPL-MCNC: 2 MG/DL — LOW (ref 2.5–4.5)
PLATELET # BLD AUTO: 198 K/UL — SIGNIFICANT CHANGE UP (ref 150–400)
POTASSIUM SERPL-MCNC: 4.3 MMOL/L — SIGNIFICANT CHANGE UP (ref 3.5–5.3)
POTASSIUM SERPL-SCNC: 4.3 MMOL/L — SIGNIFICANT CHANGE UP (ref 3.5–5.3)
RBC # BLD: 2.99 M/UL — LOW (ref 4.2–5.8)
RBC # FLD: 11.8 % — SIGNIFICANT CHANGE UP (ref 10.3–14.5)
RH IG SCN BLD-IMP: POSITIVE — SIGNIFICANT CHANGE UP
RH IG SCN BLD-IMP: POSITIVE — SIGNIFICANT CHANGE UP
SODIUM SERPL-SCNC: 138 MMOL/L — SIGNIFICANT CHANGE UP (ref 135–145)
TIBC SERPL-MCNC: 181 UG/DL — LOW (ref 220–430)
TRANSFERRIN SERPL-MCNC: 149 MG/DL — LOW (ref 200–360)
TRIGL SERPL-MCNC: 123 MG/DL — SIGNIFICANT CHANGE UP
UIBC SERPL-MCNC: 113 UG/DL — SIGNIFICANT CHANGE UP (ref 110–370)
WBC # BLD: 5.08 K/UL — SIGNIFICANT CHANGE UP (ref 3.8–10.5)
WBC # FLD AUTO: 5.08 K/UL — SIGNIFICANT CHANGE UP (ref 3.8–10.5)

## 2022-06-27 PROCEDURE — 74177 CT ABD & PELVIS W/CONTRAST: CPT | Mod: 26

## 2022-06-27 PROCEDURE — 99233 SBSQ HOSP IP/OBS HIGH 50: CPT | Mod: GC

## 2022-06-27 RX ORDER — INSULIN LISPRO 100/ML
16 VIAL (ML) SUBCUTANEOUS
Refills: 0 | Status: DISCONTINUED | OUTPATIENT
Start: 2022-06-27 | End: 2022-06-28

## 2022-06-27 RX ORDER — INSULIN GLARGINE 100 [IU]/ML
24 INJECTION, SOLUTION SUBCUTANEOUS AT BEDTIME
Refills: 0 | Status: DISCONTINUED | OUTPATIENT
Start: 2022-06-27 | End: 2022-06-28

## 2022-06-27 RX ADMIN — INSULIN GLARGINE 24 UNIT(S): 100 INJECTION, SOLUTION SUBCUTANEOUS at 22:00

## 2022-06-27 RX ADMIN — SODIUM CHLORIDE 120 MILLILITER(S): 9 INJECTION, SOLUTION INTRAVENOUS at 16:07

## 2022-06-27 RX ADMIN — Medication 6: at 09:46

## 2022-06-27 RX ADMIN — Medication 8 UNIT(S): at 17:24

## 2022-06-27 RX ADMIN — Medication 2: at 21:59

## 2022-06-27 RX ADMIN — Medication 8 UNIT(S): at 12:31

## 2022-06-27 RX ADMIN — Medication 8 UNIT(S): at 09:45

## 2022-06-27 RX ADMIN — Medication 8: at 17:24

## 2022-06-27 RX ADMIN — Medication 6: at 12:31

## 2022-06-27 NOTE — DIETITIAN INITIAL EVALUATION ADULT - OTHER INFO
66M PMH HTN, DM, asthma (no prior hospitalization or intubations), prostate cancer (s/p surgery, chemo, RT), who presents with a chief complaint of progressive weakness and found to be severely hyperglycemic to 600s.    Pt seen in room, sitting up in bed eating breakfast. Pt reports typically has good appetite, moderately follows DM diet and denies nutrition related issues until ~3 weeks ago. Pt reports going on trip to [place], experienced decreased appetite, weakness and "not feeling right." Of note, despite reported decreased appetite pt reports eating pizza, prosciutto, [employer], steak, etc while in [place]. Reports drinking Sprite d/t extreme thirst. A1C 18.8%, despite reported adherence to DM diet PTA, suspect pt now compliant with DM diet. RD provided written and verbal DM diet education, discussed carbohydrate sources as well as recommended amounts. Discussed limiting sugar sweetened beverages and high sodium foods. Pt expressed understanding and able to teachback concepts. Pt currently on consistent carbohyrate, renal diet. Please remove renal restriction not warranted (K wnl, Phos low). Pt denies wt changes, reports stability at ~175lbs. Denies N/V/D/C at present. Denies pain. Please see full nutrition recs below. Will continue to follow per RD protocol.    66M PMH HTN, DM, asthma (no prior hospitalization or intubations), prostate cancer (s/p surgery, chemo, RT), who presents with a chief complaint of progressive weakness and found to be severely hyperglycemic to 600s.    Pt seen in room, sitting up in bed eating breakfast. Pt reports typically has good appetite, moderately follows DM diet and denies nutrition related issues until ~3 weeks ago. Pt reports going on trip to [place], experienced decreased appetite, weakness and "not feeling right." Of note, despite reported decreased appetite pt reports eating pizza, prosciutto, [employer], steak, etc while in [place]. Reports drinking Sprite d/t extreme thirst. A1C 18.8%, despite reported adherence to DM diet PTA, suspect pt not compliant with DM diet. RD provided written and verbal DM diet education, discussed carbohydrate sources as well as recommended amounts. Discussed limiting sugar sweetened beverages and high sodium foods. Pt expressed understanding and able to teachback concepts. Pt currently on consistent carbohyrate, renal diet. Please remove renal restriction not warranted (K wnl, Phos low). Pt denies wt changes, reports stability at ~175lbs. Denies N/V/D/C at present. Denies pain. Please see full nutrition recs below. Will continue to follow per RD protocol.

## 2022-06-27 NOTE — DIETITIAN INITIAL EVALUATION ADULT - PERTINENT MEDS FT
MEDICATIONS  (STANDING):  dextrose 5%. 1000 milliLiter(s) (50 mL/Hr) IV Continuous <Continuous>  dextrose 5%. 1000 milliLiter(s) (100 mL/Hr) IV Continuous <Continuous>  dextrose 50% Injectable 25 Gram(s) IV Push once  dextrose 50% Injectable 12.5 Gram(s) IV Push once  dextrose 50% Injectable 25 Gram(s) IV Push once  glucagon  Injectable 1 milliGRAM(s) IntraMuscular once  insulin lispro (ADMELOG) corrective regimen sliding scale   SubCutaneous Before meals and at bedtime  insulin lispro Injectable (ADMELOG) 8 Unit(s) SubCutaneous three times a day before meals  lactated ringers. 1000 milliLiter(s) (120 mL/Hr) IV Continuous <Continuous>    MEDICATIONS  (PRN):  dextrose Oral Gel 15 Gram(s) Oral once PRN Blood Glucose LESS THAN 70 milliGRAM(s)/deciliter

## 2022-06-27 NOTE — CONSULT NOTE ADULT - SUBJECTIVE AND OBJECTIVE BOX
HPI: a 66M PMH HTN, DM, asthma (no prior hospitalization or intubations), prostate cancer (s/p prostatectomy, RT, and chemo), who presents c/o weakness.  He states that he was traveling in [place] for a few weeks -, felt increasingly sluggish, SOB, fatigued, particularly while hiking (he never had issues before, denies CHF hx).  He also reports having to use diapers because he was urinating so frequently and drinking a lot.  He had decreased appetite as well and mild RLQ episodic, cramping along with some loose stools (nonbloody).  Upon his return to the [facility], he contacted his PCP who ran some bloodwork and he was notified 1 day ago to come to the ED.  Patient reports that he watches his weight and that his a1c is normally ~7.  He was not checking his blood sugar while on his trip but reports that he usually checks it 2x/week and it ranges from .  He is compliant with his home medications.  Of note, pt did not take his BP meds today bc of relative hypotension. He is adherent to his DM medications.     In the ED:  Initial vital signs: T: 97.9 F, HR: 98, BP: 117/78, R: 18, SpO2: 98% on RA  ED course:   Labs: significant for Hgb 11.4, Na 128 (corrected 136), Cl 94, HCO3 19, BUN/Cr 65/2.30, glucose 619, BHB 1.6, lipase 252, serum osm 323, UA + >1000 glucose  Imaging:  CXR: no acute infiltrates  EKG: NSR  Medications: 10u regular insulin X3, 4L NS  Started on Lantus 16 and Lispro 8 premeal     Age at Dx:  How dx:  Hx and duration of insulin:  Current Therapy:  Hx of hypoglycemia  Hx of DKA/HHS?    Home FSG:  Fasting  Lunch  Dinner  Bed    Hx of other regimens  Complications:  Outpatient Endo:    PMH & Surgical Hx:  FH:  DM:  Thyroid:  Autoimmune:  Other:    SH:  Smoking  Etoh:  Recreational Drugs:  Social Life:    Current Meds:  dextrose 5%. 1000 milliLiter(s) IV Continuous <Continuous>  dextrose 5%. 1000 milliLiter(s) IV Continuous <Continuous>  dextrose 50% Injectable 25 Gram(s) IV Push once  dextrose 50% Injectable 12.5 Gram(s) IV Push once  dextrose 50% Injectable 25 Gram(s) IV Push once  dextrose Oral Gel 15 Gram(s) Oral once PRN  glucagon  Injectable 1 milliGRAM(s) IntraMuscular once  insulin lispro (ADMELOG) corrective regimen sliding scale   SubCutaneous Before meals and at bedtime  insulin lispro Injectable (ADMELOG) 8 Unit(s) SubCutaneous three times a day before meals  lactated ringers. 1000 milliLiter(s) IV Continuous <Continuous>      Allergies:  No Known Allergies      ROS:  Denies the following except as indicated.    General: weight loss/weight gain, decreased appetite, fatigue  Eyes: Blurry vision, double vision, visual changes  ENT: Throat pain, changes in voice,   CV: palpitations, SOB, CP, cough  GI: NVD, difficulty swallowing, abdominal pain  : polyuria, dysuria  Endo: abnormal menses, temperature intolerance, decreased libido  MSK: weakness, joint pain  Skin: rash, dryness, diaphoresis  Heme: Easy bruising,bleeding  Neuro: HA, dizziness, lightheadedness, numbness tingling  Psych: Anxiety, Depression    Vital Signs Last 24 Hrs  T(C): 36.6 (2022 12:09), Max: 37.2 (2022 14:50)  T(F): 97.8 (2022 12:09), Max: 99 (2022 14:50)  HR: 98 (2022 12:09) (75 - 98)  BP: 131/74 (2022 12:09) (99/57 - 131/74)  BP(mean): --  RR: 20 (2022 12:09) (16 - 20)  SpO2: 97% (2022 12:09) (97% - 99%)  Height (cm): 177.8 ( @ 14:50)  Weight (kg): 80.5 ( @ 14:50)  BMI (kg/m2): 25.5 ( @ 14:50)      Constitutional: wn/wd in NAD.   HEENT: NCAT, MMM, OP clear, EOMI, , no proptosis or lid retraction  Neck: no thyromegaly or palpable thyroid nodules   Respiratory: lungs CTAB.  Cardiovascular: regular rhythm, normal S1 and S2, no audible murmurs, no peripheral edema  GI: soft, NT/ND, no masses/HSM appreciated.  Neurology: no tremors, DTR 2+  Skin: no visible rashes/lesions  Psychiatric: AAO x 3, normal affect/mood.  Ext: radial pulses intact, DP pulses intact, extremities warm, no cyanosis, clubbing or edema.       LABS:                        9.2    5.08  )-----------( 198      ( 2022 05:30 )             26.9         138  |  107  |  34<H>  ----------------------------<  273<H>  4.3   |  19<L>  |  1.53<H>    Ca    7.9<L>      2022 05:30  Phos  2.0       Mg     1.3         TPro  6.8  /  Alb  3.7  /  TBili  0.8  /  DBili  x   /  AST  10  /  ALT  9<L>  /  AlkPhos  83        Urinalysis Basic - ( 2022 07:14 )    Color: Yellow / Appearance: Clear / S.015 / pH: x  Gluc: x / Ketone: NEGATIVE  / Bili: Negative / Urobili: 0.2 E.U./dL   Blood: x / Protein: NEGATIVE mg/dL / Nitrite: NEGATIVE   Leuk Esterase: NEGATIVE / RBC: x / WBC x   Sq Epi: x / Non Sq Epi: x / Bacteria: x        Thyroid Stimulating Hormone, Serum: 1.180 ( @ 08:18)      RADIOLOGY & ADDITIONAL STUDIES:  CAPILLARY BLOOD GLUCOSE      POCT Blood Glucose.: 279 mg/dL (2022 12:19)  POCT Blood Glucose.: 270 mg/dL (2022 09:43)  POCT Blood Glucose.: 231 mg/dL (2022 21:20)  POCT Blood Glucose.: 234 mg/dL (2022 16:14)        A/P:66M PMH HTN, DM, asthma (no prior hospitalization or intubations), prostate cancer (s/p surgery, chemo, RT), who presents with a chief complaint of progressive weakness and found to be severely hyperglycemic to 600s.  A1c:  Wt:  Cr:  GFR:    1.  DM  Please continue lantus       units at night.    Please continue lispro      units before each meal.   Please continue lispro moderate  dose sliding scale four times daily with meals and at bedtime    Pt's fingerstick glucose goal is 100-180    Will continue to monitor     For discharge, pt can continue    Pt can follow up at discharge with [facility] by calling  to make an appointment.   Will discuss case with Dr. Reed and update primary team HPI: a 66M PMH HTN, DM, asthma (no prior hospitalization or intubations), prostate cancer (s/p prostatectomy, RT, and chemo), who presents c/o weakness.  He states that he was traveling in [place] for a few weeks -, felt increasingly sluggish, SOB, fatigued, particularly while hiking (he never had issues before, denies CHF hx).  He also reports having to use diapers because he was urinating so frequently and drinking a lot.  He had decreased appetite as well and mild RLQ episodic, cramping along with some loose stools (nonbloody).  Upon his return to the [facility], he contacted his PCP who ran some bloodwork and he was notified 1 day ago to come to the ED.  Patient reports that he watches his weight and that his a1c is normally ~7.  He was not checking his blood sugar while on his trip but reports that he usually checks it 2x/week and it ranges from .  He is compliant with his home medications.  Of note, pt did not take his BP meds today bc of relative hypotension. He is adherent to his DM medications.     In the ED:  Initial vital signs: T: 97.9 F, HR: 98, BP: 117/78, R: 18, SpO2: 98% on RA  ED course:   Labs: significant for Hgb 11.4, Na 128 (corrected 136), Cl 94, HCO3 19, BUN/Cr 65/2.30, glucose 619, BHB 1.6, lipase 252, serum osm 323, UA + >1000 glucose  Imaging:  CXR: no acute infiltrates  EKG: NSR  Medications: 10u regular insulin X3, 4L NS  Started on Lantus 16 and Lispro 8 premeal     Dx: Diagnosed in 2018 by PCP, denies symptoms at the time.   Current Therapy: Jentadueto 5-1000mg XR QD, reports compliance   Hx of hypoglycemia- denies  Hx of DKA/HHS? none    Home FSG:  Fasting 100s  Bed mid 100s    Hx of other regimens  Complications: neuropathy , denies retinopathy   Outpatient Endo: does not have one     PMH & Surgical Hx:  FH: no hx of DM    SH:  Smoking- denies  Etoh: socially      Current Meds:  dextrose 5%. 1000 milliLiter(s) IV Continuous <Continuous>  dextrose 5%. 1000 milliLiter(s) IV Continuous <Continuous>  dextrose 50% Injectable 25 Gram(s) IV Push once  dextrose 50% Injectable 12.5 Gram(s) IV Push once  dextrose 50% Injectable 25 Gram(s) IV Push once  dextrose Oral Gel 15 Gram(s) Oral once PRN  glucagon  Injectable 1 milliGRAM(s) IntraMuscular once  insulin lispro (ADMELOG) corrective regimen sliding scale   SubCutaneous Before meals and at bedtime  insulin lispro Injectable (ADMELOG) 8 Unit(s) SubCutaneous three times a day before meals  lactated ringers. 1000 milliLiter(s) IV Continuous <Continuous>      Allergies:  No Known Allergies      ROS:  Denies the following except as indicated.    General: weight loss/weight gain, decreased appetite, fatigue  Eyes: Blurry vision, double vision, visual changes  ENT: Throat pain, changes in voice,   CV: palpitations, SOB, CP, cough  GI: NVD, difficulty swallowing, abdominal pain  : polyuria, dysuria  Endo: abnormal menses, temperature intolerance, decreased libido  MSK: weakness, joint pain  Skin: rash, dryness, diaphoresis  Heme: Easy bruising,bleeding  Neuro: HA, dizziness, lightheadedness, numbness tingling  Psych: Anxiety, Depression    Vital Signs Last 24 Hrs  T(C): 36.6 (2022 12:09), Max: 37.2 (2022 14:50)  T(F): 97.8 (2022 12:09), Max: 99 (2022 14:50)  HR: 98 (2022 12:09) (75 - 98)  BP: 131/74 (2022 12:09) (99/57 - 131/74)  BP(mean): --  RR: 20 (2022 12:09) (16 - 20)  SpO2: 97% (2022 12:09) (97% - 99%)  Height (cm): 177.8 ( @ 14:50)  Weight (kg): 80.5 ( @ 14:50)  BMI (kg/m2): 25.5 ( @ 14:50)      Constitutional: NAD.   HEENT: NCAT, MMM, OP clear, EOMI, , no proptosis or lid retraction  Neck: no thyromegaly or palpable thyroid nodules   Respiratory: lungs CTAB.  Cardiovascular: regular rhythm, normal S1 and S2, no audible murmurs, no peripheral edema  GI: soft, NT/ND, no masses/HSM appreciated.  Neurology: no tremors, DTR 2+  Skin: no visible rashes/lesions  Psychiatric: AAO x 3, normal affect/mood.  Ext: radial pulses intact, DP pulses intact, extremities warm, no cyanosis, clubbing or edema.       LABS:                        9.2    5.08  )-----------( 198      ( 2022 05:30 )             26.9         138  |  107  |  34<H>  ----------------------------<  273<H>  4.3   |  19<L>  |  1.53<H>    Ca    7.9<L>      2022 05:30  Phos  2.0       Mg     1.3         TPro  6.8  /  Alb  3.7  /  TBili  0.8  /  DBili  x   /  AST  10  /  ALT  9<L>  /  AlkPhos  83        Urinalysis Basic - ( 2022 07:14 )    Color: Yellow / Appearance: Clear / S.015 / pH: x  Gluc: x / Ketone: NEGATIVE  / Bili: Negative / Urobili: 0.2 E.U./dL   Blood: x / Protein: NEGATIVE mg/dL / Nitrite: NEGATIVE   Leuk Esterase: NEGATIVE / RBC: x / WBC x   Sq Epi: x / Non Sq Epi: x / Bacteria: x        Thyroid Stimulating Hormone, Serum: 1.180 ( @ 08:18)      RADIOLOGY & ADDITIONAL STUDIES:  CAPILLARY BLOOD GLUCOSE      POCT Blood Glucose.: 279 mg/dL (2022 12:19)  POCT Blood Glucose.: 270 mg/dL (2022 09:43)  POCT Blood Glucose.: 231 mg/dL (2022 21:20)  POCT Blood Glucose.: 234 mg/dL (2022 16:14)        A/P:66M PMH HTN, DM, asthma (no prior hospitalization or intubations), prostate cancer (s/p surgery, chemo, RT), who presents with a chief complaint of progressive weakness and found to be severely hyperglycemic to 600s.  A1c:18.8%  Wt:80.5kg  Cr:1.53  GFR:50    1.  DM type 2- uncontrolled   check c-peptide level   Please continue lantus   24    units at night.    Please continue lispro   16   units before each meal.   Please continue lispro moderate  dose sliding scale four times daily with meals and at bedtime    Pt's fingerstick glucose goal is 100-180    Will continue to monitor     For discharge, pt can continue    Pt can follow up at discharge with [facility] by calling  to make an appointment.   Will discuss case with Dr. Reed and update primary team

## 2022-06-27 NOTE — DIETITIAN INITIAL EVALUATION ADULT - NSICDXPASTMEDICALHX_GEN_ALL_CORE_FT
PAST MEDICAL HISTORY:  Asthma     Diabetes diagnosed 2018    H/O: HTN (hypertension)     Prostate cancer diagnosed [id number] s/p surgery, chemo, RT

## 2022-06-27 NOTE — PROGRESS NOTE ADULT - SUBJECTIVE AND OBJECTIVE BOX
CHINTAN MIKE, 66y, Male  [id number]  Patient is a 66y old  Male who presents with a chief complaint of hyperglycemia (2022 14:47)      OVERNIGHT EVENTS: NAEO     SUBJECTIVE: Pt seen/examined at bedside. pt endorsing he did not sleep well last night as his roommate was noisy. Pt re-enforcing that most of his symptoms began in the last 4-5 weeks however not prior to that. Per patient, he has always been compliant with medications both here in the [place] and while on his trip. Pt stating he feels similar to when he came in and denies any worsening of symptoms. ROS positive for fatigue and increased urinary frequency which has improved slightly.     12 Point ROS Negative unless noted otherwise above.  -------------------------------------------------------------------------------  VITAL SIGNS:  Vital Signs Last 24 Hrs  T(C): 36.6 (2022 12:09), Max: 37.2 (2022 21:05)  T(F): 97.8 (2022 12:09), Max: 99 (2022 21:05)  HR: 98 (2022 12:09) (75 - 98)  BP: 131/74 (2022 12:09) (108/64 - 131/74)  BP(mean): --  RR: 20 (2022 12:09) (18 - 20)  SpO2: 97% (2022 12:09) (97% - 99%)  I&O's Summary    2022 07:01  -  2022 07:00  --------------------------------------------------------  IN: 2480 mL / OUT: 1000 mL / NET: 1480 mL        PHYSICAL EXAM:    General: NAD, well developed  HEENT: NC/AT; EOMI, PERRLA, anicteric sclera; moist mucosal membranes.  Neck: supple, trachea midline  Cardiovascular: RRR, +S1/S2; NO M/R/G  Respiratory: CTA B/L; no W/R/R  Gastrointestinal: soft, NT/ND; +BSx4  Extremities: WWP; no edema or cyanosis  Vascular: 2+ radial, DP/PT pulses B/L  Neurological: AAOx3; no focal deficits    ALLERGIES:  Allergies    No Known Allergies    Intolerances        MEDICATIONS:  MEDICATIONS  (STANDING):  dextrose 5%. 1000 milliLiter(s) (50 mL/Hr) IV Continuous <Continuous>  dextrose 5%. 1000 milliLiter(s) (100 mL/Hr) IV Continuous <Continuous>  dextrose 50% Injectable 25 Gram(s) IV Push once  dextrose 50% Injectable 12.5 Gram(s) IV Push once  dextrose 50% Injectable 25 Gram(s) IV Push once  glucagon  Injectable 1 milliGRAM(s) IntraMuscular once  insulin lispro (ADMELOG) corrective regimen sliding scale   SubCutaneous Before meals and at bedtime  insulin lispro Injectable (ADMELOG) 8 Unit(s) SubCutaneous three times a day before meals  lactated ringers. 1000 milliLiter(s) (120 mL/Hr) IV Continuous <Continuous>    MEDICATIONS  (PRN):  dextrose Oral Gel 15 Gram(s) Oral once PRN Blood Glucose LESS THAN 70 milliGRAM(s)/deciliter      -------------------------------------------------------------------------------  LABS:                        9.2    5.08  )-----------( 198      ( 2022 05:30 )             26.9     06-27    138  |  107  |  34<H>  ----------------------------<  273<H>  4.3   |  19<L>  |  1.53<H>    Ca    7.9<L>      2022 05:30  Phos  2.0     06  Mg     1.3         TPro  6.8  /  Alb  3.7  /  TBili  0.8  /  DBili  x   /  AST  10  /  ALT  9<L>  /  AlkPhos  83  06-26    LIVER FUNCTIONS - ( 2022 08:18 )  Alb: 3.7 g/dL / Pro: 6.8 g/dL / ALK PHOS: 83 U/L / ALT: 9 U/L / AST: 10 U/L / GGT: x             Urinalysis Basic - ( 2022 07:14 )    Color: Yellow / Appearance: Clear / S.015 / pH: x  Gluc: x / Ketone: NEGATIVE  / Bili: Negative / Urobili: 0.2 E.U./dL   Blood: x / Protein: NEGATIVE mg/dL / Nitrite: NEGATIVE   Leuk Esterase: NEGATIVE / RBC: x / WBC x   Sq Epi: x / Non Sq Epi: x / Bacteria: x      CAPILLARY BLOOD GLUCOSE      POCT Blood Glucose.: 279 mg/dL (2022 12:19)      Urinalysis with Rflx Culture (collected 2022 07:14)      COVID-19 PCR: Negative (2022 07:32)      RADIOLOGY & ADDITIONAL TESTS: Reviewed.

## 2022-06-27 NOTE — DIETITIAN INITIAL EVALUATION ADULT - CONTINUE CURRENT NUTRITION CARE PLAN
Clinic Care Coordination Contact    Follow Up Progress Note      Assessment: Sw talked with pt over the phone. Pt shared that she had been approved for insurance through the government, but now she needed to pick a coverage plan. She could either choose [place] or [facility]. Pt stated she wanted a little help navigating this as she was unsure which would be best for her.     Goals addressed this encounter:   Goals Addressed                 This Visit's Progress       Patient Stated      Other (pt-stated)   On track     Goal Statement: I want to apply for health insurance through [employer].  Date Goal set: 4/2/20  Barriers: navigating the [employer] system  Strengths: asking for help, CCC support  Date to Achieve By: 6/1/20  Patient expressed understanding of goal: yes    Action steps to achieve this goal:  1. I will review the paperwork I received about my Medical Assistance case and let my Navigator or CHW know if I have questions.  2. I will give the Community Health Worker updates at outreach calls    Updated: 4/28/20              Intervention/Education provided during outreach: SW let pt know that if she chose [facility], that she would not be eligible for backcpay for the last three months of medical bills. SW stated that Pt should talk with her financial worker to verify. Pt stated she would call her after she got off the phone with SW.            Plan:   1) Pt will follow up with financial worker to chose the best medical coverage plan.   2) Pt will call SW/CHW with any other questions.   Care Coordinator will follow up in 45 days.     LUIS Jalloh  Clinic Care Coordinator   [facility]   [phone]     yes

## 2022-06-27 NOTE — DIETITIAN INITIAL EVALUATION ADULT - ADD RECOMMEND
1. Consistent carbohydrate, low sodium diet (liberalize renal restriction)  2. Reinforce education prn  3. Monitor lytes, BG  4. RD to remain available prn

## 2022-06-27 NOTE — DIETITIAN INITIAL EVALUATION ADULT - PERTINENT LABORATORY DATA
06-27    138  |  107  |  34<H>  ----------------------------<  273<H>  4.3   |  19<L>  |  1.53<H>    Ca    7.9<L>      27 Jun 2022 05:30  Phos  2.0     06-27  Mg     1.3     06-27    TPro  6.8  /  Alb  3.7  /  TBili  0.8  /  DBili  x   /  AST  10  /  ALT  9<L>  /  AlkPhos  83  06-26  POCT Blood Glucose.: 279 mg/dL (06-27-22 @ 12:19)  A1C with Estimated Average Glucose Result: 18.8 % (06-26-22 @ 13:37)

## 2022-06-27 NOTE — CONSULT NOTE ADULT - ATTENDING COMMENTS
The patient was admitted because of progressive weakness,dry mouth with a history of Type II Diabetes mellitus. Blood glucose was 619 mg% on admission with Hgb A1C of 18.8%.He was then given 30 units Regular Insulin IV. Subsequent glucose levels were [phone] yesterday and today 270-279. I agree with treating his Diabetes with 24 units Lantus Insulin and 16 units pre-meal Lispro Insulin.

## 2022-06-27 NOTE — PROGRESS NOTE ADULT - SUBJECTIVE AND OBJECTIVE BOX
Patient is a 66y old  Male who presents with a chief complaint of hyperglycemia (2022 14:47)      INTERVAL HPI/OVERNIGHT EVENTS:    Pt. seen and examined earlier today  Pt. reports less urinary frequency  Pt. denies F/C, CP, SOB  CT results d/w Pt.    Review of Systems: 12 point review of systems otherwise negative    MEDICATIONS  (STANDING):  dextrose 5%. 1000 milliLiter(s) (50 mL/Hr) IV Continuous <Continuous>  dextrose 5%. 1000 milliLiter(s) (100 mL/Hr) IV Continuous <Continuous>  dextrose 50% Injectable 25 Gram(s) IV Push once  dextrose 50% Injectable 12.5 Gram(s) IV Push once  dextrose 50% Injectable 25 Gram(s) IV Push once  glucagon  Injectable 1 milliGRAM(s) IntraMuscular once  insulin glargine Injectable (LANTUS) 24 Unit(s) SubCutaneous at bedtime  insulin lispro (ADMELOG) corrective regimen sliding scale   SubCutaneous Before meals and at bedtime  insulin lispro Injectable (ADMELOG) 16 Unit(s) SubCutaneous three times a day before meals  lactated ringers. 1000 milliLiter(s) (120 mL/Hr) IV Continuous <Continuous>    MEDICATIONS  (PRN):  dextrose Oral Gel 15 Gram(s) Oral once PRN Blood Glucose LESS THAN 70 milliGRAM(s)/deciliter      Allergies    No Known Allergies    Intolerances          Vital Signs Last 24 Hrs  T(C): 37 (2022 20:52), Max: 37.2 (2022 21:05)  T(F): 98.6 (2022 20:52), Max: 99 (2022 21:05)  HR: 84 (2022 20:52) (75 - 98)  BP: 116/68 (2022 20:52) (108/64 - 131/74)  BP(mean): --  RR: 19 (2022 20:52) (18 - 20)  SpO2: 97% (2022 20:52) (97% - 99%)  CAPILLARY BLOOD GLUCOSE      POCT Blood Glucose.: 312 mg/dL (2022 17:14)  POCT Blood Glucose.: 279 mg/dL (2022 12:19)  POCT Blood Glucose.: 270 mg/dL (2022 09:43)  POCT Blood Glucose.: 231 mg/dL (2022 21:20)       @ 07: @ 07:00  --------------------------------------------------------  IN: 2480 mL / OUT: 1000 mL / NET: 1480 mL     @ 07: @ 21:00  --------------------------------------------------------  IN: 0 mL / OUT: 250 mL / NET: -250 mL        Physical Exam:  (earlier today)  Daily     Daily   General:  well-appearing in NAD  HEENT:  MMM  Abdomen:  soft NT ND  Extremities:  no edema B/L LE  Skin:  WWP  Neuro:  AAOx3  rest of exam per PGY-1    LABS:                        9.2    5.08  )-----------( 198      ( 2022 05:30 )             26.9     06    138  |  107  |  34<H>  ----------------------------<  273<H>  4.3   |  19<L>  |  1.53<H>    Ca    7.9<L>      2022 05:30  Phos  2.0       Mg     1.3         TPro  6.8  /  Alb  3.7  /  TBili  0.8  /  DBili  x   /  AST  10  /  ALT  9<L>  /  AlkPhos  83        Urinalysis Basic - ( 2022 07:14 )    Color: Yellow / Appearance: Clear / S.015 / pH: x  Gluc: x / Ketone: NEGATIVE  / Bili: Negative / Urobili: 0.2 E.U./dL   Blood: x / Protein: NEGATIVE mg/dL / Nitrite: NEGATIVE   Leuk Esterase: NEGATIVE / RBC: x / WBC x   Sq Epi: x / Non Sq Epi: x / Bacteria: x

## 2022-06-28 ENCOUNTER — TRANSCRIPTION ENCOUNTER (OUTPATIENT)
Age: 67
End: 2022-06-28

## 2022-06-28 LAB
ALBUMIN SERPL ELPH-MCNC: 3.4 G/DL — SIGNIFICANT CHANGE UP (ref 3.3–5)
ALP SERPL-CCNC: 62 U/L — SIGNIFICANT CHANGE UP (ref 40–120)
ALT FLD-CCNC: 10 U/L — SIGNIFICANT CHANGE UP (ref 10–45)
ANION GAP SERPL CALC-SCNC: 9 MMOL/L — SIGNIFICANT CHANGE UP (ref 5–17)
AST SERPL-CCNC: 17 U/L — SIGNIFICANT CHANGE UP (ref 10–40)
BASOPHILS # BLD AUTO: 0.04 K/UL — SIGNIFICANT CHANGE UP (ref 0–0.2)
BASOPHILS NFR BLD AUTO: 0.7 % — SIGNIFICANT CHANGE UP (ref 0–2)
BILIRUB SERPL-MCNC: 0.5 MG/DL — SIGNIFICANT CHANGE UP (ref 0.2–1.2)
BUN SERPL-MCNC: 18 MG/DL — SIGNIFICANT CHANGE UP (ref 7–23)
CALCIUM SERPL-MCNC: 8.3 MG/DL — LOW (ref 8.4–10.5)
CHLORIDE SERPL-SCNC: 107 MMOL/L — SIGNIFICANT CHANGE UP (ref 96–108)
CO2 SERPL-SCNC: 22 MMOL/L — SIGNIFICANT CHANGE UP (ref 22–31)
CREAT SERPL-MCNC: 1.36 MG/DL — HIGH (ref 0.5–1.3)
EGFR: 58 ML/MIN/1.73M2 — LOW
EOSINOPHIL # BLD AUTO: 0.17 K/UL — SIGNIFICANT CHANGE UP (ref 0–0.5)
EOSINOPHIL NFR BLD AUTO: 2.8 % — SIGNIFICANT CHANGE UP (ref 0–6)
GLUCOSE BLDC GLUCOMTR-MCNC: 122 MG/DL — HIGH (ref 70–99)
GLUCOSE BLDC GLUCOMTR-MCNC: 145 MG/DL — HIGH (ref 70–99)
GLUCOSE BLDC GLUCOMTR-MCNC: 227 MG/DL — HIGH (ref 70–99)
GLUCOSE BLDC GLUCOMTR-MCNC: 242 MG/DL — HIGH (ref 70–99)
GLUCOSE SERPL-MCNC: 211 MG/DL — HIGH (ref 70–99)
HCT VFR BLD CALC: 30.8 % — LOW (ref 39–50)
HGB BLD-MCNC: 10.6 G/DL — LOW (ref 13–17)
IMM GRANULOCYTES NFR BLD AUTO: 0.3 % — SIGNIFICANT CHANGE UP (ref 0–1.5)
LYMPHOCYTES # BLD AUTO: 1.02 K/UL — SIGNIFICANT CHANGE UP (ref 1–3.3)
LYMPHOCYTES # BLD AUTO: 17.1 % — SIGNIFICANT CHANGE UP (ref 13–44)
MAGNESIUM SERPL-MCNC: 1.4 MG/DL — LOW (ref 1.6–2.6)
MCHC RBC-ENTMCNC: 31.5 PG — SIGNIFICANT CHANGE UP (ref 27–34)
MCHC RBC-ENTMCNC: 34.4 GM/DL — SIGNIFICANT CHANGE UP (ref 32–36)
MCV RBC AUTO: 91.4 FL — SIGNIFICANT CHANGE UP (ref 80–100)
MONOCYTES # BLD AUTO: 0.6 K/UL — SIGNIFICANT CHANGE UP (ref 0–0.9)
MONOCYTES NFR BLD AUTO: 10 % — SIGNIFICANT CHANGE UP (ref 2–14)
NEUTROPHILS # BLD AUTO: 4.13 K/UL — SIGNIFICANT CHANGE UP (ref 1.8–7.4)
NEUTROPHILS NFR BLD AUTO: 69.1 % — SIGNIFICANT CHANGE UP (ref 43–77)
NRBC # BLD: 0 /100 WBCS — SIGNIFICANT CHANGE UP (ref 0–0)
PHOSPHATE SERPL-MCNC: 2.3 MG/DL — LOW (ref 2.5–4.5)
PLATELET # BLD AUTO: 237 K/UL — SIGNIFICANT CHANGE UP (ref 150–400)
POTASSIUM SERPL-MCNC: 4.3 MMOL/L — SIGNIFICANT CHANGE UP (ref 3.5–5.3)
POTASSIUM SERPL-SCNC: 4.3 MMOL/L — SIGNIFICANT CHANGE UP (ref 3.5–5.3)
PROT SERPL-MCNC: 6.3 G/DL — SIGNIFICANT CHANGE UP (ref 6–8.3)
RBC # BLD: 3.37 M/UL — LOW (ref 4.2–5.8)
RBC # FLD: 11.7 % — SIGNIFICANT CHANGE UP (ref 10.3–14.5)
SODIUM SERPL-SCNC: 138 MMOL/L — SIGNIFICANT CHANGE UP (ref 135–145)
WBC # BLD: 5.98 K/UL — SIGNIFICANT CHANGE UP (ref 3.8–10.5)
WBC # FLD AUTO: 5.98 K/UL — SIGNIFICANT CHANGE UP (ref 3.8–10.5)

## 2022-06-28 PROCEDURE — 99233 SBSQ HOSP IP/OBS HIGH 50: CPT | Mod: GC

## 2022-06-28 RX ORDER — MAGNESIUM SULFATE 500 MG/ML
2 VIAL (ML) INJECTION ONCE
Refills: 0 | Status: COMPLETED | OUTPATIENT
Start: 2022-06-28 | End: 2022-06-28

## 2022-06-28 RX ORDER — HEPARIN SODIUM 5000 [USP'U]/ML
5000 INJECTION INTRAVENOUS; SUBCUTANEOUS EVERY 8 HOURS
Refills: 0 | Status: DISCONTINUED | OUTPATIENT
Start: 2022-06-28 | End: 2022-06-29

## 2022-06-28 RX ORDER — INSULIN LISPRO 100/ML
24 VIAL (ML) SUBCUTANEOUS
Refills: 0 | Status: DISCONTINUED | OUTPATIENT
Start: 2022-06-28 | End: 2022-06-28

## 2022-06-28 RX ORDER — INSULIN LISPRO 100/ML
20 VIAL (ML) SUBCUTANEOUS
Refills: 0 | Status: DISCONTINUED | OUTPATIENT
Start: 2022-06-28 | End: 2022-06-29

## 2022-06-28 RX ORDER — ACETAMINOPHEN 500 MG
650 TABLET ORAL EVERY 6 HOURS
Refills: 0 | Status: DISCONTINUED | OUTPATIENT
Start: 2022-06-28 | End: 2022-06-29

## 2022-06-28 RX ORDER — INSULIN GLARGINE 100 [IU]/ML
30 INJECTION, SOLUTION SUBCUTANEOUS AT BEDTIME
Refills: 0 | Status: DISCONTINUED | OUTPATIENT
Start: 2022-06-28 | End: 2022-06-29

## 2022-06-28 RX ADMIN — Medication 20 UNIT(S): at 18:04

## 2022-06-28 RX ADMIN — Medication 25 GRAM(S): at 19:18

## 2022-06-28 RX ADMIN — Medication 650 MILLIGRAM(S): at 20:15

## 2022-06-28 RX ADMIN — Medication 16 UNIT(S): at 08:26

## 2022-06-28 RX ADMIN — Medication 650 MILLIGRAM(S): at 10:05

## 2022-06-28 RX ADMIN — HEPARIN SODIUM 5000 UNIT(S): 5000 INJECTION INTRAVENOUS; SUBCUTANEOUS at 14:27

## 2022-06-28 RX ADMIN — Medication 25 GRAM(S): at 10:04

## 2022-06-28 RX ADMIN — Medication 62.5 MILLIMOLE(S): at 12:10

## 2022-06-28 RX ADMIN — INSULIN GLARGINE 30 UNIT(S): 100 INJECTION, SOLUTION SUBCUTANEOUS at 22:51

## 2022-06-28 RX ADMIN — Medication 650 MILLIGRAM(S): at 11:00

## 2022-06-28 RX ADMIN — Medication 4: at 13:05

## 2022-06-28 RX ADMIN — Medication 650 MILLIGRAM(S): at 19:17

## 2022-06-28 RX ADMIN — Medication 16 UNIT(S): at 13:05

## 2022-06-28 RX ADMIN — Medication 4: at 08:26

## 2022-06-28 NOTE — DISCHARGE NOTE PROVIDER - HOSPITAL COURSE
<<<RESIDENT DISCHARGE NOTE>>>     CHINTAN MIKE  [id number]    Patient is a 67 yo M with a PMH of HTN, DM, asthma, prostate cancer s/p surgery and chemo presented with CC of progressive weakness and found   to be severely hypoglycemic     Problems:    Hyperglycemia due to diabetes mellitus.   JUAN (acute kidney injury)  Anemia    6/26: admitted for hyperglycemia, a1c 18.8.  endo consulted, started on lantus 16u STAT, 8u premeal, and   switched to LR at 120cc/hr.  o/n: Called CT ED, placed on schedule.   6/27: CT A/P showing no panceatic mass. Continued on IV fluids i/s/o contrast used for imaging. Started on Lantus 24 and Lispro 18 per Endocrine recs.   6/28: repleted Phos and Mg, per endo rec , increased Lantus from 24 to 30 and increased Lispro from 18 to 20.       Problem List/Main Diagnoses (problem-based):   Inpatient treatment course:   New medications:   Labs to be followed outpatient:   Exam to be followed outpatient:       VITAL SIGNS:  T(F): 97.6 (06-28-22 @ 16:41), Max: 98.6 (06-27-22 @ 20:52)  HR: 95 (06-28-22 @ 16:41)  BP: 115/64 (06-28-22 @ 16:41)  SpO2: 98% (06-28-22 @ 16:41)      PHYSICAL EXAMINATION:  GENERAL: NAD, Resting in bed  HEENT: NCAT  CHEST/LUNG: Clear to auscultation bilaterally; No wheezing or rubs.   HEART: Regular rate and rhythm; No murmurs, rubs, or gallops  ABDOMEN: Bowel sounds present; Soft, Nontender, Nondistended.   EXTREMITIES:  No clubbing, cyanosis, or edema  NERVOUS SYSTEM:  Alert & Oriented X3      TEST RESULTS:                        10.6   5.98  )-----------( 237      ( 28 Jun 2022 08:10 )             30.8       06-28    138  |  107  |  18  ----------------------------<  211<H>  4.3   |  22  |  1.36<H>    Ca    8.3<L>      28 Jun 2022 08:10  Phos  2.3     06-28  Mg     1.4     06-28    TPro  6.3  /  Alb  3.4  /  TBili  0.5  /  DBili  x   /  AST  17  /  ALT  10  /  AlkPhos  62  06-28      FINAL DISCHARGE INTERVIEW:  Resident(s) Present: (Name:_____________), RN Present: (Name:  ___________)    DISCHARGE MEDICATION RECONCILIATION  reviewed with Attending (Name:___________)    DISPOSITION:   [  ] Home,    [  ] Home with Visiting Nursing Services,   [    ]  SNF/ NH,    [   ] Acute Rehab (4A),   [   ] Other (Specify:_________)                          <<<RESIDENT DISCHARGE NOTE>>>     CHINTAN MIKE  [id number]    Patient is a 65 yo M with a PMH of HTN, DM, asthma, prostate cancer s/p surgery and chemo presented with CC of progressive weakness and found   to be severely hypoglycemic       Problem List/Main Diagnoses (problem-based):   Hyperglycemia due to DM  - with the help of endocrine team, pt was placed on an insulin regimen. Lantus 30, Lispro 20    JUAN  - Pt presented with prerenal JUAN, resolved over hospital stay with fluid management     Hyponatremia  - Pt presented with hyponatremia, resolved with glucose control    Inpatient treatment course:   Pt admitted for hyperglycemia, a1c 18.8.  endo consulted, started on lantus 16u STAT, 8u premeal, and mISS.  switched to LR at 120cc/hr. Called CT ED, placed on schedule.   CT A/P showing no panceatic mass. Continued on IV fluids i/s/o contrast used for imaging. Started on Lantus 24 and Lispro 18 per Endocrine recs. repleted Phos and Mg, per endo rec , increased Lantus from 24 to 30 and increased Lispro from 18 to 20. Stopped LR; called [facility] diabetes educator, to see pt tomorrow AM. Complained of headache not responding to tylenol, given fioricet per pharmacy. headache so gave ibuprofen 200 x1. Planned for discharge. Pt was seen by diabetes educator.  , endo consulted for d/c recommendation. They  Recommended 30 Lantus, 20 Lispro    New medications:   Lantus 30 Units  Lispro 20 Units     Labs to be followed outpatient:   Follow glucose levels closely, obtain A1C in few months    Exam to be followed outpatient:       VITAL SIGNS:  T(F): 97.6 (06-28-22 @ 16:41), Max: 98.6 (06-27-22 @ 20:52)  HR: 95 (06-28-22 @ 16:41)  BP: 115/64 (06-28-22 @ 16:41)  SpO2: 98% (06-28-22 @ 16:41)      PHYSICAL EXAMINATION:  GENERAL: NAD, Resting in bed  HEENT: NCAT  CHEST/LUNG: Clear to auscultation bilaterally; No wheezing or rubs.   HEART: Regular rate and rhythm; No murmurs, rubs, or gallops  ABDOMEN: Bowel sounds present; Soft, Nontender, Nondistended.   EXTREMITIES:  No clubbing, cyanosis, or edema  NERVOUS SYSTEM:  Alert & Oriented X3      TEST RESULTS:                        10.6   5.98  )-----------( 237      ( 28 Jun 2022 08:10 )             30.8       06-28    138  |  107  |  18  ----------------------------<  211<H>  4.3   |  22  |  1.36<H>    Ca    8.3<L>      28 Jun 2022 08:10  Phos  2.3     06-28  Mg     1.4     06-28    TPro  6.3  /  Alb  3.4  /  TBili  0.5  /  DBili  x   /  AST  17  /  ALT  10  /  AlkPhos  62  06-28                 <<<RESIDENT DISCHARGE NOTE>>>     CHINTAN MIKE  [id number]    Patient is a 65 yo M with a PMH of HTN, DM, asthma, prostate cancer s/p surgery and chemo presented with CC of progressive weakness and found   to be severely hypoglycemic       Problem List/Main Diagnoses (problem-based):   Hyperglycemia due to DM  - with the help of endocrine team, pt was placed on an insulin regimen. Lantus 30, Lispro 20    JUAN  - Pt presented with prerenal JUAN, resolved over hospital stay with fluid management     Hyponatremia  - Pt presented with hyponatremia, resolved with glucose control    Inpatient treatment course:  Pt is Negative for COVID-19.  Pt admitted for hyperglycemia, a1c 18.8.  endo consulted, started on lantus 16u STAT, 8u premeal, and mISS.  switched to LR at 120cc/hr. Called CT ED, placed on schedule.   CT A/P showing no panceatic mass. Continued on IV fluids i/s/o contrast used for imaging. Started on Lantus 24 and Lispro 18 per Endocrine recs. repleted Phos and Mg, per endo rec , increased Lantus from 24 to 30 and increased Lispro from 18 to 20. Stopped LR; called Deepthi diabetes educator, to see pt tomorrow AM. Complained of headache not responding to tylenol, given fioricet per pharmacy. headache so gave ibuprofen 200 x1. Planned for discharge. Pt was seen by diabetes educator.  , endo consulted for d/c recommendation. They  Recommended 30 Lantus, 20 Lispro    New medications:   Lantus 30 Units  Lispro 20 Units     Labs to be followed outpatient:   Follow glucose levels closely, obtain A1C in few months    Exam to be followed outpatient:       VITAL SIGNS:  T(F): 97.6 (06-28-22 @ 16:41), Max: 98.6 (06-27-22 @ 20:52)  HR: 95 (06-28-22 @ 16:41)  BP: 115/64 (06-28-22 @ 16:41)  SpO2: 98% (06-28-22 @ 16:41)      PHYSICAL EXAMINATION:  GENERAL: NAD, Resting in bed  HEENT: NCAT  CHEST/LUNG: Clear to auscultation bilaterally; No wheezing or rubs.   HEART: Regular rate and rhythm; No murmurs, rubs, or gallops  ABDOMEN: Bowel sounds present; Soft, Nontender, Nondistended.   EXTREMITIES:  No clubbing, cyanosis, or edema  NERVOUS SYSTEM:  Alert & Oriented X3      TEST RESULTS:                        10.6   5.98  )-----------( 237      ( 28 Jun 2022 08:10 )             30.8       06-28    138  |  107  |  18  ----------------------------<  211<H>  4.3   |  22  |  1.36<H>    Ca    8.3<L>      28 Jun 2022 08:10  Phos  2.3     06-28  Mg     1.4     06-28    TPro  6.3  /  Alb  3.4  /  TBili  0.5  /  DBili  x   /  AST  17  /  ALT  10  /  AlkPhos  62  06-28                 <<<RESIDENT DISCHARGE NOTE>>>     CHINTAN MIKE  MRN-[id number]    Patient is a 65 yo M with a PMH of HTN, DM, asthma, prostate cancer s/p surgery and chemo presented with CC of progressive weakness and found   to be severely hypoglycemic       Problem List/Main Diagnoses (problem-based):   Hyperglycemia due to DM  - with the help of endocrine team, pt was placed on an insulin regimen. Lantus 30, Lispro 20    JUAN  - Pt presented with prerenal JUAN, resolved over hospital stay with fluid management     Hyponatremia  - Pt presented with hyponatremia, resolved with glucose control    Inpatient treatment course:  Pt is Negative for COVID-19.  Pt admitted for hyperglycemia, a1c 18.8.  endo consulted, started on lantus 16u STAT, 8u premeal, and mISS.  switched to LR at 120cc/hr. Called CT ED, placed on schedule.   CT A/P showing no panceatic mass. Continued on IV fluids i/s/o contrast used for imaging. Started on Lantus 24 and Lispro 18 per Endocrine recs. repleted Phos and Mg, per endo rec , increased Lantus from 24 to 30 and increased Lispro from 18 to 20. Stopped LR; called Deepthi diabetes educator, to see pt tomorrow AM. Complained of headache not responding to tylenol, given fioricet per pharmacy. headache so gave ibuprofen 200 x1. Planned for discharge. Pt was seen by diabetes educator.  , endo consulted for d/c recommendation. They  Recommended 30 Lantus, 20 Lispro    New medications:   Lantus 30 Units  Lispro 20 Units     Labs to be followed outpatient: follow glucose levels closely, obtain A1C in 3 months    Exam to be followed outpatient: Endocrinology, PCP    Discharge Exam:  VITAL SIGNS:  T(F): 97.6 (06-28-22 @ 16:41), Max: 98.6 (06-27-22 @ 20:52)  HR: 95 (06-28-22 @ 16:41)  BP: 115/64 (06-28-22 @ 16:41)  SpO2: 98% (06-28-22 @ 16:41)      PHYSICAL EXAMINATION:  GENERAL: NAD, Resting in bed  HEENT: NCAT  CHEST/LUNG: Clear to auscultation bilaterally; No wheezing or rubs.   HEART: Regular rate and rhythm; No murmurs, rubs, or gallops  ABDOMEN: Bowel sounds present; Soft, Nontender, Nondistended.   EXTREMITIES:  No clubbing, cyanosis, or edema  NERVOUS SYSTEM:  Alert & Oriented X3

## 2022-06-28 NOTE — DISCHARGE NOTE PROVIDER - NSDCCPCAREPLAN_GEN_ALL_CORE_FT
PRINCIPAL DISCHARGE DIAGNOSIS  Diagnosis: Hyperglycemia  Assessment and Plan of Treatment: Your diabetes require the use of at home insulin going forward to control your blood sugar and prevent similar episodes that resulted in your admission. Please follow the guidance provided by the education team and follow up with providers as outpatient.      SECONDARY DISCHARGE DIAGNOSES  Diagnosis: JUAN (acute kidney injury)  Assessment and Plan of Treatment: On initial presentation to the hospital, your labs showed a degree of kidney injury. With treatment, your kidneys have began showing signs of recovery.    Diagnosis: Hyponatremia  Assessment and Plan of Treatment: On initial presentation your sodium was low. This was due to your high blood sugar. With the correction of your blood sugar, your sodium has returned to normal levels.     PRINCIPAL DISCHARGE DIAGNOSIS  Diagnosis: Hyperglycemia  Assessment and Plan of Treatment: Your diabetes require the use of at home insulin going forward to control your blood sugar and prevent similar episodes that resulted in your admission. Please follow the guidance provided by the education team and follow up with providers as outpatient.      SECONDARY DISCHARGE DIAGNOSES  Diagnosis: JUAN (acute kidney injury)  Assessment and Plan of Treatment: On initial presentation to the hospital, your labs showed a degree of kidney injury. With treatment, your kidneys have recovered.    Diagnosis: Hyponatremia  Assessment and Plan of Treatment: On initial presentation your sodium was low. This was due to your high blood sugar. With the correction of your blood sugar, your sodium has returned to normal levels.     PRINCIPAL DISCHARGE DIAGNOSIS  Diagnosis: Hyperglycemia  Assessment and Plan of Treatment: Your diabetes require the use of at home insulin going forward to control your blood sugar and prevent similar episodes that resulted in your admission. Please follow the guidance provided by the education team and follow up with providers as outpatient. We recommend the following regimen:  1. Lantus 30 units in evening  2. Lispro 20 units before meals  You have a known history of diabetes mellitus prior to your admission. This condition results from blood sugar levels getting too high because your body is more resistant to insulin. Uncontrolled blood sugar levels can lead to kidney and heart damage, pain/numbness/paralysis in your hands and feet, and increased rates of infections.  To manage this you are on a medication called INSULIN. It is important that you continue to take this medication when you are discharged so that you can continue to control your blood sugar levels. Additionally be sure to follow up with your primary care physician, podiatrist, and ophthalmologist on a regular basis.      SECONDARY DISCHARGE DIAGNOSES  Diagnosis: JUAN (acute kidney injury)  Assessment and Plan of Treatment: On initial presentation to the hospital, your labs showed a degree of kidney injury. With treatment, your kidneys have recovered. More information below.  During this hospital admission you were found to have acute kidney injury. Acute kidney injury (JUAN) is a sudden episode of kidney failure or kidney damage that happens within a few hours or a few days. JUAN causes a build-up of waste products in your blood and makes it hard for your kidneys to keep the right balance of fluid in your body. Your kidney numbers have been improving. Please follow up with your primary care doctor for further evaluation and testing.    Diagnosis: Hyponatremia  Assessment and Plan of Treatment: On initial presentation your sodium was low. This was due to your high blood sugar. With the correction of your blood sugar, your sodium has returned to normal levels.

## 2022-06-28 NOTE — PROGRESS NOTE ADULT - ATTENDING COMMENTS
Glucose levels were 312-169 yesterday and today 227-242. I agree with Increasing the Insulin to 30 units Lantus Insulin plus 24 units pre-meal Lispro Insulin.

## 2022-06-28 NOTE — DISCHARGE NOTE PROVIDER - CARE PROVIDER_API CALL
Carlos Puga  INTERNAL MEDICINE  [address] [place] [id number]  Phone: [phone]  Fax: [phone]  Established Patient  Scheduled Appointment: 07/05/2022 09:00 AM

## 2022-06-28 NOTE — PROGRESS NOTE ADULT - PROBLEM SELECTOR PLAN 10
F: s/p 4L NS, LR IVMF at 120cc/hr  E: replete K<4, Mg<2  N: CC, low sodium  DVT proph: SCDs  GI proph: none needed    FULL CODE
F: s/p 4L NS, LR IVMF at 120cc/hr  E: replete K<4, Mg<2  N: CC, low sodium  DVT proph: SCDs  GI proph: none needed    FULL CODE

## 2022-06-28 NOTE — PROGRESS NOTE ADULT - PROBLEM SELECTOR PLAN 7
Hx of anxiety on clonazpeam 1mg bid prn, although patient reports that he takes this medication only a couple times a month.  - hold until requested    #Decreased energy  Patient reports that he has been started on ritalin 5mg bid for low energy.  He only takes this pill 2x/week.  - hold until requested
Hx of anxiety on clonazpeam 1mg bid prn, although patient reports that he takes this medication only a couple times a month.  - hold until requested    #Decreased energy  Patient reports that he has been started on ritalin 5mg bid for low energy.  He only takes this pill 2x/week.  - hold until requested

## 2022-06-28 NOTE — PROGRESS NOTE ADULT - PROBLEM SELECTOR PLAN 8
Hx of asthma on albuterol prn.  Patient reports only using the medication a couple of times per month.  - restart if sob/wheezing
Hx of asthma on albuterol prn.  Patient reports only using the medication a couple of times per month.  - restart if sob/wheezing

## 2022-06-28 NOTE — PROGRESS NOTE ADULT - PROBLEM SELECTOR PLAN 9
Hx of seasonal allergies on hydroxyzine hydrochloride 10mg bid prn.  Currently not endorsing any symptoms.  - restart upon request
Hx of seasonal allergies on hydroxyzine hydrochloride 10mg bid prn.  Currently not endorsing any symptoms.  - restart upon request

## 2022-06-28 NOTE — PROGRESS NOTE ADULT - SUBJECTIVE AND OBJECTIVE BOX
INTERVAL HPI/OVERNIGHT EVENTS:  O/N:  This morning: Patient was seen and examined at bedside.      VITAL SIGNS:  T(F): 97.8 (06-28-22 @ 05:34)  HR: 78 (06-28-22 @ 05:34)  BP: 112/59 (06-28-22 @ 05:34)  RR: 18 (06-28-22 @ 05:34)  SpO2: 98% (06-28-22 @ 05:34)  Wt(kg): --    PHYSICAL EXAM:    Constitutional: NAD  HEENT: PERRL, EOMI, sclera non-icteric, neck supple, trachea midline, no masses, no JVD, MMM, good dentition  Respiratory: CTA b/l, good air entry b/l, no wheezing, no rhonchi, no rales, without accessory muscle use and no intercostal retractions  Cardiovascular: RRR, normal S1S2, no M/R/G  Gastrointestinal: abdomen soft, NTND, no masses palpable, BS normal  Extremities: Warm, well perfused, pulses equal bilateral upper and lower extremities, no edema, no clubbing  Neurological: AAOx3, CN Grossly intact  Skin: Normal temperature, warm, dry    MEDICATIONS  (STANDING):  dextrose 5%. 1000 milliLiter(s) (50 mL/Hr) IV Continuous <Continuous>  dextrose 5%. 1000 milliLiter(s) (100 mL/Hr) IV Continuous <Continuous>  dextrose 50% Injectable 25 Gram(s) IV Push once  dextrose 50% Injectable 12.5 Gram(s) IV Push once  dextrose 50% Injectable 25 Gram(s) IV Push once  glucagon  Injectable 1 milliGRAM(s) IntraMuscular once  insulin glargine Injectable (LANTUS) 24 Unit(s) SubCutaneous at bedtime  insulin lispro (ADMELOG) corrective regimen sliding scale   SubCutaneous Before meals and at bedtime  insulin lispro Injectable (ADMELOG) 16 Unit(s) SubCutaneous three times a day before meals  lactated ringers. 1000 milliLiter(s) (120 mL/Hr) IV Continuous <Continuous>    MEDICATIONS  (PRN):  acetaminophen     Tablet .. 650 milliGRAM(s) Oral every 6 hours PRN Moderate Pain (4 - 6)  dextrose Oral Gel 15 Gram(s) Oral once PRN Blood Glucose LESS THAN 70 milliGRAM(s)/deciliter      Allergies    No Known Allergies    Intolerances        LABS:                        10.6   5.98  )-----------( 237      ( 28 Jun 2022 08:10 )             30.8     06-28    138  |  107  |  x   ----------------------------<  x   4.3   |  x   |  x     Ca    7.9<L>      27 Jun 2022 05:30  Phos  2.0     06-27  Mg     1.3     06-27                  RADIOLOGY & ADDITIONAL TESTS:  Reviewed INTERVAL HPI/OVERNIGHT EVENTS:  66 y.o M w/ PMHx of HTN, DM, and prostate cancer presented to the ED due to weakness. He is admitted with the diagnosis of hyperglycemia secondary to uncontrolled DM.     O/N: No acute events over night    Subjective: Pt complains of headache rated at 6/10 in severity. Concerned about the nature of his anemia.    This morning: Patient was seen and examined at bedside.      VITAL SIGNS:  T(F): 97.8 (06-28-22 @ 05:34)  HR: 78 (06-28-22 @ 05:34)  BP: 112/59 (06-28-22 @ 05:34)  RR: 18 (06-28-22 @ 05:34)  SpO2: 98% (06-28-22 @ 05:34)  Wt(kg): --    PHYSICAL EXAM:    Constitutional: NAD  HEENT: PERRL, EOMI, sclera non-icteric, neck supple, trachea midline, no masses, no JVD, MMM, good dentition  Respiratory: CTA b/l, good air entry b/l, no wheezing, no rhonchi, no rales, without accessory muscle use and no intercostal retractions  Cardiovascular: RRR, normal S1S2, no M/R/G  Gastrointestinal: abdomen soft, NTND, no masses palpable, BS normal  Extremities: Warm, well perfused, pulses equal bilateral upper and lower extremities, no edema, no clubbing  Neurological: AAOx3, CN Grossly intact  Skin: Normal temperature, warm, dry    MEDICATIONS  (STANDING):  dextrose 5%. 1000 milliLiter(s) (50 mL/Hr) IV Continuous <Continuous>  dextrose 5%. 1000 milliLiter(s) (100 mL/Hr) IV Continuous <Continuous>  dextrose 50% Injectable 25 Gram(s) IV Push once  dextrose 50% Injectable 12.5 Gram(s) IV Push once  dextrose 50% Injectable 25 Gram(s) IV Push once  glucagon  Injectable 1 milliGRAM(s) IntraMuscular once  insulin glargine Injectable (LANTUS) 24 Unit(s) SubCutaneous at bedtime  insulin lispro (ADMELOG) corrective regimen sliding scale   SubCutaneous Before meals and at bedtime  insulin lispro Injectable (ADMELOG) 16 Unit(s) SubCutaneous three times a day before meals  lactated ringers. 1000 milliLiter(s) (120 mL/Hr) IV Continuous <Continuous>    MEDICATIONS  (PRN):  acetaminophen     Tablet .. 650 milliGRAM(s) Oral every 6 hours PRN Moderate Pain (4 - 6)  dextrose Oral Gel 15 Gram(s) Oral once PRN Blood Glucose LESS THAN 70 milliGRAM(s)/deciliter      Allergies    No Known Allergies    Intolerances        LABS:                        10.6   5.98  )-----------( 237      ( 28 Jun 2022 08:10 )             30.8     06-28    138  |  107  |  x   ----------------------------<  x   4.3   |  x   |  x     Ca    7.9<L>      27 Jun 2022 05:30  Phos  2.0     06-27  Mg     1.3     06-27                  RADIOLOGY & ADDITIONAL TESTS:  < from: CT Abdomen and Pelvis w/ IV Cont (06.27.22 @ 09:33) >  No evidence of pancreatic mass.  Bladder wall thickening which could represent neurogenic bladder,   cystitis.  Status post radical prostatectomy.      < end of copied text >    Reviewed

## 2022-06-28 NOTE — PROGRESS NOTE ADULT - SUBJECTIVE AND OBJECTIVE BOX
INTERVAL HPI/OVERNIGHT EVENTS:    Patient is a 66y old  Male who presents with a chief complaint of hyperglycemia (28 Jun 2022 08:57)      Pt reports the following symptoms:    CONSTITUTIONAL:  Negative fever or chills, feels well, good appetite  EYES:  Negative  blurry vision or double vision  CARDIOVASCULAR:  Negative for chest pain or palpitations  RESPIRATORY:  Negative for cough, wheezing, or SOB   GASTROINTESTINAL:  Negative for nausea, vomiting, diarrhea, constipation, or abdominal pain  GENITOURINARY:  Negative frequency, urgency or dysuria  NEUROLOGIC:  No headache, confusion, dizziness, lightheadedness    MEDICATIONS  (STANDING):  dextrose 5%. 1000 milliLiter(s) (50 mL/Hr) IV Continuous <Continuous>  dextrose 5%. 1000 milliLiter(s) (100 mL/Hr) IV Continuous <Continuous>  dextrose 50% Injectable 25 Gram(s) IV Push once  dextrose 50% Injectable 12.5 Gram(s) IV Push once  dextrose 50% Injectable 25 Gram(s) IV Push once  glucagon  Injectable 1 milliGRAM(s) IntraMuscular once  heparin   Injectable 5000 Unit(s) SubCutaneous every 8 hours  insulin glargine Injectable (LANTUS) 24 Unit(s) SubCutaneous at bedtime  insulin lispro (ADMELOG) corrective regimen sliding scale   SubCutaneous Before meals and at bedtime  insulin lispro Injectable (ADMELOG) 16 Unit(s) SubCutaneous three times a day before meals  lactated ringers. 1000 milliLiter(s) (120 mL/Hr) IV Continuous <Continuous>  magnesium sulfate  IVPB 2 Gram(s) IV Intermittent once    MEDICATIONS  (PRN):  acetaminophen     Tablet .. 650 milliGRAM(s) Oral every 6 hours PRN Moderate Pain (4 - 6)  dextrose Oral Gel 15 Gram(s) Oral once PRN Blood Glucose LESS THAN 70 milliGRAM(s)/deciliter      PHYSICAL EXAM  Vital Signs Last 24 Hrs  T(C): 36.7 (28 Jun 2022 11:32), Max: 37 (27 Jun 2022 20:52)  T(F): 98 (28 Jun 2022 11:32), Max: 98.6 (27 Jun 2022 20:52)  HR: 90 (28 Jun 2022 11:32) (78 - 90)  BP: 123/86 (28 Jun 2022 11:32) (112/59 - 123/86)  BP(mean): --  RR: 20 (28 Jun 2022 11:32) (18 - 20)  SpO2: 97% (28 Jun 2022 11:32) (97% - 98%)    Constitutional: NAD.   HEENT: NCAT, MMM, OP clear, EOMI, , no proptosis or lid retraction  Neck: no thyromegaly or palpable thyroid nodules   Respiratory: lungs CTAB.  Cardiovascular: regular rhythm, normal S1 and S2, no audible murmurs, no peripheral edema  GI: soft, NT/ND, no masses/HSM appreciated.  Neurology: no tremors, DTR 2+  Skin: no visible rashes/lesions  Psychiatric: AAO x 3, normal affect/mood.    LABS:                        10.6   5.98  )-----------( 237      ( 28 Jun 2022 08:10 )             30.8     06-28    138  |  107  |  18  ----------------------------<  211<H>  4.3   |  22  |  1.36<H>    Ca    8.3<L>      28 Jun 2022 08:10  Phos  2.3     06-28  Mg     1.4     06-28    TPro  6.3  /  Alb  3.4  /  TBili  0.5  /  DBili  x   /  AST  17  /  ALT  10  /  AlkPhos  62  06-28        Thyroid Stimulating Hormone, Serum: 1.180 uIU/mL (06-26 @ 08:18)      HbA1C:         Insulin Sliding Scale requirements X 24 Hours:      RADIOLOGY & ADDITIONAL TESTS:      A/P:66M PMH HTN, DM, asthma (no prior hospitalization or intubations), prostate cancer (s/p surgery, chemo, RT), who presents with a chief complaint of progressive weakness and found to be severely hyperglycemic to 600s.  A1c:18.8%  Wt:80.5kg  Cr:1.53  GFR:50  home regimen: Jentadueto XR 5-1000mg QD    1.  DM type 2- uncontrolled   check c-peptide level   Please continue lantus       units at night.    Please continue lispro    units before each meal.   Please continue lispro moderate  dose sliding scale four times daily with meals and at bedtime    Pt's fingerstick glucose goal is 100-180    Will continue to monitor     Patient in need of diabetes education  For discharge, patient will need insulin, please have nurses provide insulin teaching    Pt can follow up at discharge with [facility] by calling  to make an appointment.   Will discuss case with Dr. Reed and update primary team   INTERVAL HPI/OVERNIGHT EVENTS:    Patient is a 66y old  Male who presents with a chief complaint of hyperglycemia (28 Jun 2022 08:57  Patient feeling better, no longer dizzy.   Blood sugars remain elevated.    Pt reports the following symptoms:    CONSTITUTIONAL:  Negative fever or chills, feels well, good appetite  EYES:  Negative  blurry vision or double vision  CARDIOVASCULAR:  Negative for chest pain or palpitations  RESPIRATORY:  Negative for cough, wheezing, or SOB   GASTROINTESTINAL:  Negative for nausea, vomiting, diarrhea, constipation, or abdominal pain  GENITOURINARY:  Negative frequency, urgency or dysuria  NEUROLOGIC:  No headache, confusion, dizziness, lightheadedness    MEDICATIONS  (STANDING):  dextrose 5%. 1000 milliLiter(s) (50 mL/Hr) IV Continuous <Continuous>  dextrose 5%. 1000 milliLiter(s) (100 mL/Hr) IV Continuous <Continuous>  dextrose 50% Injectable 25 Gram(s) IV Push once  dextrose 50% Injectable 12.5 Gram(s) IV Push once  dextrose 50% Injectable 25 Gram(s) IV Push once  glucagon  Injectable 1 milliGRAM(s) IntraMuscular once  heparin   Injectable 5000 Unit(s) SubCutaneous every 8 hours  insulin glargine Injectable (LANTUS) 24 Unit(s) SubCutaneous at bedtime  insulin lispro (ADMELOG) corrective regimen sliding scale   SubCutaneous Before meals and at bedtime  insulin lispro Injectable (ADMELOG) 16 Unit(s) SubCutaneous three times a day before meals  lactated ringers. 1000 milliLiter(s) (120 mL/Hr) IV Continuous <Continuous>  magnesium sulfate  IVPB 2 Gram(s) IV Intermittent once    MEDICATIONS  (PRN):  acetaminophen     Tablet .. 650 milliGRAM(s) Oral every 6 hours PRN Moderate Pain (4 - 6)  dextrose Oral Gel 15 Gram(s) Oral once PRN Blood Glucose LESS THAN 70 milliGRAM(s)/deciliter      PHYSICAL EXAM  Vital Signs Last 24 Hrs  T(C): 36.7 (28 Jun 2022 11:32), Max: 37 (27 Jun 2022 20:52)  T(F): 98 (28 Jun 2022 11:32), Max: 98.6 (27 Jun 2022 20:52)  HR: 90 (28 Jun 2022 11:32) (78 - 90)  BP: 123/86 (28 Jun 2022 11:32) (112/59 - 123/86)  BP(mean): --  RR: 20 (28 Jun 2022 11:32) (18 - 20)  SpO2: 97% (28 Jun 2022 11:32) (97% - 98%)    Constitutional: NAD.   HEENT: NCAT, MMM, OP clear, EOMI, , no proptosis or lid retraction  Neck: no thyromegaly or palpable thyroid nodules   Respiratory: lungs CTAB.  Cardiovascular: regular rhythm, normal S1 and S2, no audible murmurs, no peripheral edema  GI: soft, NT/ND, no masses/HSM appreciated.  Neurology: no tremors, DTR 2+  Skin: no visible rashes/lesions  Psychiatric: AAO x 3, normal affect/mood.    LABS:                        10.6   5.98  )-----------( 237      ( 28 Jun 2022 08:10 )             30.8     06-28    138  |  107  |  18  ----------------------------<  211<H>  4.3   |  22  |  1.36<H>    Ca    8.3<L>      28 Jun 2022 08:10  Phos  2.3     06-28  Mg     1.4     06-28    TPro  6.3  /  Alb  3.4  /  TBili  0.5  /  DBili  x   /  AST  17  /  ALT  10  /  AlkPhos  62  06-28        Thyroid Stimulating Hormone, Serum: 1.180 uIU/mL (06-26 @ 08:18)      HbA1C:         Insulin Sliding Scale requirements X 24 Hours:      RADIOLOGY & ADDITIONAL TESTS:      A/P:66M PMH HTN, DM, asthma (no prior hospitalization or intubations), prostate cancer (s/p surgery, chemo, RT), who presents with a chief complaint of progressive weakness and found to be severely hyperglycemic to 600s.  A1c:18.8%  Wt:80.5kg  Cr:1.53  GFR:50  home regimen: Jentadueto XR 5-1000mg QD    1.  DM type 2- uncontrolled   check c-peptide level   Please continue lantus 30  units at night.    Please continue lispro  20  units before each meal.   Please continue lispro moderate  dose sliding scale four times daily with meals and at bedtime    Pt's fingerstick glucose goal is 100-180    Will continue to monitor     Patient in need of diabetes education  For discharge, patient will need insulin, please have nurses provide insulin teaching    Pt can follow up at discharge with [facility] by calling  to make an appointment.   Will discuss case with Dr. Reed and update primary team

## 2022-06-28 NOTE — DISCHARGE NOTE PROVIDER - NSDCMRMEDTOKEN_GEN_ALL_CORE_FT
albuterol 0.63 mg/3 mL (0.021%) inhalation solution: 3 milliliter(s) inhaled 3 times a day, As Needed  clonazePAM 1 mg oral tablet: 1 tab(s) orally 2 times a day, As Needed  hydrOXYzine hydrochloride 10 mg oral tablet: 2 tab(s) orally 2 times a day, As Needed  Jentadueto XR 5 mg-1000 mg oral tablet, extended release: 1 tab(s) orally once a day  lisinopril-hydrochlorothiazide 20 mg-25 mg oral tablet: 1 tab(s) orally once a day  Ritalin 5 mg oral tablet: 1 tab(s) orally 2 times a day   albuterol 0.63 mg/3 mL (0.021%) inhalation solution: 3 milliliter(s) inhaled 3 times a day, As Needed  clonazePAM 1 mg oral tablet: 1 tab(s) orally 2 times a day, As Needed  insulin glargine 100 units/mL subcutaneous solution: 30 unit(s) subcutaneous once a day (at bedtime)  Ritalin 5 mg oral tablet: 1 tab(s) orally 2 times a day   albuterol 0.63 mg/3 mL (0.021%) inhalation solution: 3 milliliter(s) inhaled 3 times a day, As Needed  alcohol swabs : Apply topically to affected area 4 times a day   clonazePAM 1 mg oral tablet: 1 tab(s) orally 2 times a day, As Needed  glucometer (per patient&#x27;s insurance): Test blood sugars four times a day. Dispense #1 glucometer.  HumaLOG KwikPen 100 units/mL injectable solution: 20 unit(s) injectable 3 times a day (before meals)   hydrOXYzine hydrochloride 10 mg oral tablet: 2 tab(s) orally 2 times a day, As Needed  insulin glargine 100 units/mL subcutaneous solution: 30 unit(s) subcutaneous once a day (at bedtime)  Insulin Pen Needles, 4mm: 1 application subcutaneously 4 times a day. ** Use with insulin pen **   lancets: 1 application subcutaneously 4 times a day   Ritalin 5 mg oral tablet: 1 tab(s) orally 2 times a day  test strips (per patient&#x27;s insurance): 1 application subcutaneously 4 times a day. ** Compatible with patient&#x27;s glucometer **   albuterol 0.63 mg/3 mL (0.021%) inhalation solution: 3 milliliter(s) inhaled 3 times a day, As Needed  alcohol swabs : Apply topically to affected area 4 times a day   Basaglar KwikPen 100 units/mL subcutaneous solution: 30 unit(s) subcutaneous once a day (at bedtime)  clonazePAM 1 mg oral tablet: 1 tab(s) orally 2 times a day, As Needed  glucometer (per patient&#x27;s insurance): Test blood sugars four times a day. Dispense #1 glucometer.  HumaLOG KwikPen 100 units/mL injectable solution: 20 unit(s) injectable 3 times a day (before meals)   hydrOXYzine hydrochloride 10 mg oral tablet: 2 tab(s) orally 2 times a day, As Needed  Insulin Pen Needles, 4mm: 1 application subcutaneously 4 times a day. ** Use with insulin pen **   lancets: 1 application subcutaneously 4 times a day   Ritalin 5 mg oral tablet: 1 tab(s) orally 2 times a day  test strips (per patient&#x27;s insurance): 1 application subcutaneously 4 times a day. ** Compatible with patient&#x27;s glucometer **   albuterol 0.63 mg/3 mL (0.021%) inhalation solution: 3 milliliter(s) inhaled 3 times a day, As Needed  alcohol swabs : Apply topically to affected area 4 times a day   clonazePAM 1 mg oral tablet: 1 tab(s) orally 2 times a day, As Needed  glucometer (per patient&#x27;s insurance): Test blood sugars four times a day. Dispense #1 glucometer.  HumaLOG KwikPen 100 units/mL injectable solution: 20 unit(s) injectable 3 times a day (before meals)   hydrOXYzine hydrochloride 10 mg oral tablet: 2 tab(s) orally 2 times a day, As Needed  Insulin Pen Needles, 4mm: 1 application subcutaneously 4 times a day. ** Use with insulin pen **   lancets: 1 application subcutaneously 4 times a day   Lantus Solostar Pen 100 units/mL subcutaneous solution: 30 unit(s) subcutaneous once a day (at bedtime)  Ritalin 5 mg oral tablet: 1 tab(s) orally 2 times a day  test strips (per patient&#x27;s insurance): 1 application subcutaneously 4 times a day. ** Compatible with patient&#x27;s glucometer **

## 2022-06-28 NOTE — PROGRESS NOTE ADULT - SUBJECTIVE AND OBJECTIVE BOX
Patient is a 66y old  Male who presents with a chief complaint of hyperglycemia (28 Jun 2022 17:42)      INTERVAL HPI/OVERNIGHT EVENTS:    Pt. seen and examined earlier today  Pt. has no new complaints    Review of Systems: 12 point review of systems otherwise negative    MEDICATIONS  (STANDING):  dextrose 5%. 1000 milliLiter(s) (50 mL/Hr) IV Continuous <Continuous>  dextrose 5%. 1000 milliLiter(s) (100 mL/Hr) IV Continuous <Continuous>  dextrose 50% Injectable 25 Gram(s) IV Push once  dextrose 50% Injectable 12.5 Gram(s) IV Push once  dextrose 50% Injectable 25 Gram(s) IV Push once  glucagon  Injectable 1 milliGRAM(s) IntraMuscular once  heparin   Injectable 5000 Unit(s) SubCutaneous every 8 hours  insulin glargine Injectable (LANTUS) 30 Unit(s) SubCutaneous at bedtime  insulin lispro (ADMELOG) corrective regimen sliding scale   SubCutaneous Before meals and at bedtime  insulin lispro Injectable (ADMELOG) 20 Unit(s) SubCutaneous three times a day before meals    MEDICATIONS  (PRN):  acetaminophen     Tablet .. 650 milliGRAM(s) Oral every 6 hours PRN Moderate Pain (4 - 6)  dextrose Oral Gel 15 Gram(s) Oral once PRN Blood Glucose LESS THAN 70 milliGRAM(s)/deciliter      Allergies    No Known Allergies    Intolerances          Vital Signs Last 24 Hrs  T(C): 36.4 (28 Jun 2022 16:41), Max: 37 (27 Jun 2022 20:52)  T(F): 97.6 (28 Jun 2022 16:41), Max: 98.6 (27 Jun 2022 20:52)  HR: 95 (28 Jun 2022 16:41) (78 - 95)  BP: 115/64 (28 Jun 2022 16:41) (112/59 - 123/86)  BP(mean): --  RR: 18 (28 Jun 2022 16:41) (18 - 20)  SpO2: 98% (28 Jun 2022 16:41) (97% - 98%)  CAPILLARY BLOOD GLUCOSE      POCT Blood Glucose.: 145 mg/dL (28 Jun 2022 16:52)  POCT Blood Glucose.: 242 mg/dL (28 Jun 2022 12:25)  POCT Blood Glucose.: 227 mg/dL (28 Jun 2022 08:21)  POCT Blood Glucose.: 169 mg/dL (27 Jun 2022 21:56)      06-27 @ 07:01  -  06-28 @ 07:00  --------------------------------------------------------  IN: 1440 mL / OUT: 250 mL / NET: 1190 mL        Physical Exam:  (earlier today)  Daily     Daily   General:  well-appearing in NAD  HEENT:  MMM  Extremities:  no edema B/L LE  Skin:  WWP  Neuro:  AAOx3  rest of exam per PGY-1    LABS:                        10.6   5.98  )-----------( 237      ( 28 Jun 2022 08:10 )             30.8     06-28    138  |  107  |  18  ----------------------------<  211<H>  4.3   |  22  |  1.36<H>    Ca    8.3<L>      28 Jun 2022 08:10  Phos  2.3     06-28  Mg     1.4     06-28    TPro  6.3  /  Alb  3.4  /  TBili  0.5  /  DBili  x   /  AST  17  /  ALT  10  /  AlkPhos  62  06-28

## 2022-06-28 NOTE — DISCHARGE NOTE PROVIDER - NSDCFUADDAPPT_GEN_ALL_CORE_FT
We recommend diabetes follow up at the following comprehensive diabetes center:    [facility]  Program ID: [id number]  Address: [facility],[address] [place] [address], 2nd floor,  City: [place]  State: [place]  ZIP: [id number]  Phone: [phone]

## 2022-06-28 NOTE — DISCHARGE NOTE PROVIDER - PROVIDER TOKENS
PROVIDER:[[id number]],SCHEDULEDAPPT:[07/05/2022],SCHEDULEDAPPTTIME:[09:00 AM],ESTABLISHEDPATIENT:[T]]

## 2022-06-29 ENCOUNTER — TRANSCRIPTION ENCOUNTER (OUTPATIENT)
Age: 67
End: 2022-06-29

## 2022-06-29 VITALS
HEART RATE: 86 BPM | OXYGEN SATURATION: 98 % | RESPIRATION RATE: 18 BRPM | TEMPERATURE: 98 F | SYSTOLIC BLOOD PRESSURE: 122 MMHG | DIASTOLIC BLOOD PRESSURE: 75 MMHG

## 2022-06-29 LAB
ANION GAP SERPL CALC-SCNC: 9 MMOL/L — SIGNIFICANT CHANGE UP (ref 5–17)
BASOPHILS # BLD AUTO: 0.03 K/UL — SIGNIFICANT CHANGE UP (ref 0–0.2)
BASOPHILS NFR BLD AUTO: 0.7 % — SIGNIFICANT CHANGE UP (ref 0–2)
BUN SERPL-MCNC: 15 MG/DL — SIGNIFICANT CHANGE UP (ref 7–23)
CALCIUM SERPL-MCNC: 8.3 MG/DL — LOW (ref 8.4–10.5)
CHLORIDE SERPL-SCNC: 111 MMOL/L — HIGH (ref 96–108)
CO2 SERPL-SCNC: 21 MMOL/L — LOW (ref 22–31)
CREAT SERPL-MCNC: 1.16 MG/DL — SIGNIFICANT CHANGE UP (ref 0.5–1.3)
EGFR: 70 ML/MIN/1.73M2 — SIGNIFICANT CHANGE UP
EOSINOPHIL # BLD AUTO: 0.19 K/UL — SIGNIFICANT CHANGE UP (ref 0–0.5)
EOSINOPHIL NFR BLD AUTO: 4.5 % — SIGNIFICANT CHANGE UP (ref 0–6)
GLUCOSE BLDC GLUCOMTR-MCNC: 134 MG/DL — HIGH (ref 70–99)
GLUCOSE BLDC GLUCOMTR-MCNC: 176 MG/DL — HIGH (ref 70–99)
GLUCOSE BLDC GLUCOMTR-MCNC: 207 MG/DL — HIGH (ref 70–99)
GLUCOSE SERPL-MCNC: 150 MG/DL — HIGH (ref 70–99)
HCT VFR BLD CALC: 28.5 % — LOW (ref 39–50)
HGB BLD-MCNC: 9.6 G/DL — LOW (ref 13–17)
IMM GRANULOCYTES NFR BLD AUTO: 0.5 % — SIGNIFICANT CHANGE UP (ref 0–1.5)
LYMPHOCYTES # BLD AUTO: 0.64 K/UL — LOW (ref 1–3.3)
LYMPHOCYTES # BLD AUTO: 15.1 % — SIGNIFICANT CHANGE UP (ref 13–44)
MAGNESIUM SERPL-MCNC: 2.1 MG/DL — SIGNIFICANT CHANGE UP (ref 1.6–2.6)
MCHC RBC-ENTMCNC: 30.9 PG — SIGNIFICANT CHANGE UP (ref 27–34)
MCHC RBC-ENTMCNC: 33.7 GM/DL — SIGNIFICANT CHANGE UP (ref 32–36)
MCV RBC AUTO: 91.6 FL — SIGNIFICANT CHANGE UP (ref 80–100)
MONOCYTES # BLD AUTO: 0.45 K/UL — SIGNIFICANT CHANGE UP (ref 0–0.9)
MONOCYTES NFR BLD AUTO: 10.6 % — SIGNIFICANT CHANGE UP (ref 2–14)
NEUTROPHILS # BLD AUTO: 2.91 K/UL — SIGNIFICANT CHANGE UP (ref 1.8–7.4)
NEUTROPHILS NFR BLD AUTO: 68.6 % — SIGNIFICANT CHANGE UP (ref 43–77)
NRBC # BLD: 0 /100 WBCS — SIGNIFICANT CHANGE UP (ref 0–0)
PHOSPHATE SERPL-MCNC: 2.4 MG/DL — LOW (ref 2.5–4.5)
PLATELET # BLD AUTO: 207 K/UL — SIGNIFICANT CHANGE UP (ref 150–400)
POTASSIUM SERPL-MCNC: 4 MMOL/L — SIGNIFICANT CHANGE UP (ref 3.5–5.3)
POTASSIUM SERPL-SCNC: 4 MMOL/L — SIGNIFICANT CHANGE UP (ref 3.5–5.3)
RBC # BLD: 3.11 M/UL — LOW (ref 4.2–5.8)
RBC # FLD: 11.9 % — SIGNIFICANT CHANGE UP (ref 10.3–14.5)
SODIUM SERPL-SCNC: 141 MMOL/L — SIGNIFICANT CHANGE UP (ref 135–145)
WBC # BLD: 4.24 K/UL — SIGNIFICANT CHANGE UP (ref 3.8–10.5)
WBC # FLD AUTO: 4.24 K/UL — SIGNIFICANT CHANGE UP (ref 3.8–10.5)

## 2022-06-29 PROCEDURE — 96376 TX/PRO/DX INJ SAME DRUG ADON: CPT

## 2022-06-29 PROCEDURE — 81003 URINALYSIS AUTO W/O SCOPE: CPT

## 2022-06-29 PROCEDURE — 99239 HOSP IP/OBS DSCHRG MGMT >30: CPT | Mod: GC

## 2022-06-29 PROCEDURE — 96374 THER/PROPH/DIAG INJ IV PUSH: CPT

## 2022-06-29 PROCEDURE — 85025 COMPLETE CBC W/AUTO DIFF WBC: CPT

## 2022-06-29 PROCEDURE — 82803 BLOOD GASES ANY COMBINATION: CPT

## 2022-06-29 PROCEDURE — 84466 ASSAY OF TRANSFERRIN: CPT

## 2022-06-29 PROCEDURE — 85027 COMPLETE CBC AUTOMATED: CPT

## 2022-06-29 PROCEDURE — 82010 KETONE BODYS QUAN: CPT

## 2022-06-29 PROCEDURE — 84484 ASSAY OF TROPONIN QUANT: CPT

## 2022-06-29 PROCEDURE — 83036 HEMOGLOBIN GLYCOSYLATED A1C: CPT

## 2022-06-29 PROCEDURE — 83930 ASSAY OF BLOOD OSMOLALITY: CPT

## 2022-06-29 PROCEDURE — 83935 ASSAY OF URINE OSMOLALITY: CPT

## 2022-06-29 PROCEDURE — 71045 X-RAY EXAM CHEST 1 VIEW: CPT

## 2022-06-29 PROCEDURE — 86803 HEPATITIS C AB TEST: CPT

## 2022-06-29 PROCEDURE — 83690 ASSAY OF LIPASE: CPT

## 2022-06-29 PROCEDURE — 82962 GLUCOSE BLOOD TEST: CPT

## 2022-06-29 PROCEDURE — 74177 CT ABD & PELVIS W/CONTRAST: CPT

## 2022-06-29 PROCEDURE — 83735 ASSAY OF MAGNESIUM: CPT

## 2022-06-29 PROCEDURE — 36415 COLL VENOUS BLD VENIPUNCTURE: CPT

## 2022-06-29 PROCEDURE — 87635 SARS-COV-2 COVID-19 AMP PRB: CPT

## 2022-06-29 PROCEDURE — 86301 IMMUNOASSAY TUMOR CA 19-9: CPT

## 2022-06-29 PROCEDURE — 86901 BLOOD TYPING SEROLOGIC RH(D): CPT

## 2022-06-29 PROCEDURE — 86900 BLOOD TYPING SEROLOGIC ABO: CPT

## 2022-06-29 PROCEDURE — 99285 EMERGENCY DEPT VISIT HI MDM: CPT

## 2022-06-29 PROCEDURE — 84478 ASSAY OF TRIGLYCERIDES: CPT

## 2022-06-29 PROCEDURE — 82570 ASSAY OF URINE CREATININE: CPT

## 2022-06-29 PROCEDURE — 84100 ASSAY OF PHOSPHORUS: CPT

## 2022-06-29 PROCEDURE — 80048 BASIC METABOLIC PNL TOTAL CA: CPT

## 2022-06-29 PROCEDURE — 82728 ASSAY OF FERRITIN: CPT

## 2022-06-29 PROCEDURE — 84133 ASSAY OF URINE POTASSIUM: CPT

## 2022-06-29 PROCEDURE — 86850 RBC ANTIBODY SCREEN: CPT

## 2022-06-29 PROCEDURE — 83605 ASSAY OF LACTIC ACID: CPT

## 2022-06-29 PROCEDURE — 84300 ASSAY OF URINE SODIUM: CPT

## 2022-06-29 PROCEDURE — 83550 IRON BINDING TEST: CPT

## 2022-06-29 PROCEDURE — 84443 ASSAY THYROID STIM HORMONE: CPT

## 2022-06-29 PROCEDURE — 80061 LIPID PANEL: CPT

## 2022-06-29 PROCEDURE — 80053 COMPREHEN METABOLIC PANEL: CPT

## 2022-06-29 PROCEDURE — 83540 ASSAY OF IRON: CPT

## 2022-06-29 RX ORDER — INSULIN GLARGINE 100 [IU]/ML
30 INJECTION, SOLUTION SUBCUTANEOUS
Qty: 2 | Refills: 2
Start: 2022-06-29

## 2022-06-29 RX ORDER — HYDROXYZINE HCL 10 MG
2 TABLET ORAL
Qty: 0 | Refills: 0 | DISCHARGE

## 2022-06-29 RX ORDER — IBUPROFEN 200 MG
200 TABLET ORAL ONCE
Refills: 0 | Status: COMPLETED | OUTPATIENT
Start: 2022-06-29 | End: 2022-06-29

## 2022-06-29 RX ORDER — INSULIN LISPRO 100/ML
20 VIAL (ML) SUBCUTANEOUS
Qty: 1800 | Refills: 0
Start: 2022-06-29 | End: 2022-07-28

## 2022-06-29 RX ORDER — ISOPROPYL ALCOHOL, BENZOCAINE .7; .06 ML/ML; ML/ML
1 SWAB TOPICAL
Qty: 100 | Refills: 1
Start: 2022-06-29 | End: 2022-08-17

## 2022-06-29 RX ORDER — LISINOPRIL/HYDROCHLOROTHIAZIDE 10-12.5 MG
1 TABLET ORAL
Qty: 0 | Refills: 0 | DISCHARGE

## 2022-06-29 RX ORDER — LINAGLIPTIN AND METFORMIN HYDROCHLORIDE 2.5; 85 MG/1; MG/1
1 TABLET, FILM COATED ORAL
Qty: 0 | Refills: 0 | DISCHARGE

## 2022-06-29 RX ORDER — INSULIN GLARGINE 100 [IU]/ML
30 INJECTION, SOLUTION SUBCUTANEOUS
Qty: 0 | Refills: 0 | DISCHARGE
Start: 2022-06-29

## 2022-06-29 RX ORDER — SODIUM,POTASSIUM PHOSPHATES 278-250MG
1 POWDER IN PACKET (EA) ORAL
Refills: 0 | Status: DISCONTINUED | OUTPATIENT
Start: 2022-06-29 | End: 2022-06-29

## 2022-06-29 RX ORDER — HYDROXYZINE HCL 10 MG
2 TABLET ORAL
Qty: 0 | Refills: 0 | DISCHARGE
Start: 2022-06-29

## 2022-06-29 RX ORDER — ENOXAPARIN SODIUM 100 MG/ML
30 INJECTION SUBCUTANEOUS
Qty: 1 | Refills: 2
Start: 2022-06-29 | End: 2022-09-26

## 2022-06-29 RX ADMIN — Medication 200 MILLIGRAM(S): at 11:06

## 2022-06-29 RX ADMIN — Medication 1 CAPSULE(S): at 01:07

## 2022-06-29 RX ADMIN — Medication 1 PACKET(S): at 12:51

## 2022-06-29 RX ADMIN — Medication 1 CAPSULE(S): at 02:00

## 2022-06-29 RX ADMIN — Medication 20 UNIT(S): at 09:10

## 2022-06-29 RX ADMIN — Medication 20 UNIT(S): at 12:50

## 2022-06-29 RX ADMIN — Medication 20 UNIT(S): at 17:46

## 2022-06-29 RX ADMIN — Medication 1 PACKET(S): at 18:16

## 2022-06-29 RX ADMIN — Medication 2: at 09:09

## 2022-06-29 RX ADMIN — Medication 4: at 12:49

## 2022-06-29 RX ADMIN — Medication 200 MILLIGRAM(S): at 12:10

## 2022-06-29 NOTE — PROGRESS NOTE ADULT - PROBLEM SELECTOR PLAN 2
RESOLVING   Presents with Cr 2.30 (unclear baseline).  Perhaps component of dehydration and CKD 2/2 DM.  Downtrending with fluids. FeNa showing intrisic JUAN. Started on LR @ 120cc/hr overnight and continued throughout the day  - AM Cr 1.53  - c/w fluids given patient underwent CT w/ IV contrast   - hold home HCTZ/Lisinopil, restart as tolerated. Normotensive at this time   - avoid nephrotoxic meds  - renally dose meds
resolving w/ IVF; monitor BMP; hold ACE-I; oral hydration encouraged
resolved w/ IVF; monitor BMP; hold ACE-I; oral hydration encouraged
resolving w/ IVF; monitor BMP; hold ACE-I
RESOLVING   Presents with Cr 2.30 (unclear baseline).  Perhaps component of dehydration and CKD 2/2 DM.  Downtrending with fluids. FeNa showing intrisic JUAN. Started on LR @ 120cc/hr overnight and continued throughout the day  - AM Cr 1.53  - c/w fluids given patient underwent CT w/ IV contrast   - hold home HCTZ/Lisinopil, restart as tolerated. Normotensive at this time   - avoid nephrotoxic meds  - renally dose meds

## 2022-06-29 NOTE — PROGRESS NOTE ADULT - SUBJECTIVE AND OBJECTIVE BOX
Patient is a 66y old  Male who presents with a chief complaint of hyperglycemia (28 Jun 2022 20:13)      INTERVAL HPI/OVERNIGHT EVENTS:    Pt. seen and examined earlier today  Pt. reports feeling better  c/o occasional HA relieved w/ analgesics  No new complaints    Review of Systems: 12 point review of systems otherwise negative    MEDICATIONS  (STANDING):  dextrose 5%. 1000 milliLiter(s) (50 mL/Hr) IV Continuous <Continuous>  dextrose 5%. 1000 milliLiter(s) (100 mL/Hr) IV Continuous <Continuous>  dextrose 50% Injectable 25 Gram(s) IV Push once  dextrose 50% Injectable 12.5 Gram(s) IV Push once  dextrose 50% Injectable 25 Gram(s) IV Push once  glucagon  Injectable 1 milliGRAM(s) IntraMuscular once  insulin glargine Injectable (LANTUS) 30 Unit(s) SubCutaneous at bedtime  insulin lispro (ADMELOG) corrective regimen sliding scale   SubCutaneous Before meals and at bedtime  insulin lispro Injectable (ADMELOG) 20 Unit(s) SubCutaneous three times a day before meals  potassium phosphate / sodium phosphate Powder (PHOS-NaK) 1 Packet(s) Oral three times a day with meals    MEDICATIONS  (PRN):  acetaminophen     Tablet .. 650 milliGRAM(s) Oral every 6 hours PRN Moderate Pain (4 - 6)  dextrose Oral Gel 15 Gram(s) Oral once PRN Blood Glucose LESS THAN 70 milliGRAM(s)/deciliter      Allergies    No Known Allergies    Intolerances          Vital Signs Last 24 Hrs  T(C): 36.7 (29 Jun 2022 13:08), Max: 37 (28 Jun 2022 21:50)  T(F): 98 (29 Jun 2022 13:08), Max: 98.6 (28 Jun 2022 21:50)  HR: 86 (29 Jun 2022 13:08) (74 - 95)  BP: 122/75 (29 Jun 2022 13:08) (114/72 - 122/75)  BP(mean): --  RR: 18 (29 Jun 2022 13:08) (18 - 18)  SpO2: 98% (29 Jun 2022 13:08) (96% - 98%)  CAPILLARY BLOOD GLUCOSE      POCT Blood Glucose.: 207 mg/dL (29 Jun 2022 12:42)  POCT Blood Glucose.: 176 mg/dL (29 Jun 2022 09:04)  POCT Blood Glucose.: 122 mg/dL (28 Jun 2022 22:00)  POCT Blood Glucose.: 145 mg/dL (28 Jun 2022 16:52)      06-28 @ 07:01  -  06-29 @ 07:00  --------------------------------------------------------  IN: 0 mL / OUT: 200 mL / NET: -200 mL        Physical Exam:  (earlier today)  Daily     Daily   General:  well-appearing in NAD  HEENT:  MMM  CV:  RRR, no JVD  Lungs:  CTA B/L  Abdomen:  soft NT ND  Extremities:  no edema B/L LE  Skin:  WWP  Neuro:  AAOx3    LABS:                        9.6    4.24  )-----------( 207      ( 29 Jun 2022 05:30 )             28.5     06-29    141  |  111<H>  |  15  ----------------------------<  150<H>  4.0   |  21<L>  |  1.16    Ca    8.3<L>      29 Jun 2022 05:30  Phos  2.4     06-29  Mg     2.1     06-29    TPro  6.3  /  Alb  3.4  /  TBili  0.5  /  DBili  x   /  AST  17  /  ALT  10  /  AlkPhos  62  06-28

## 2022-06-29 NOTE — PROGRESS NOTE ADULT - ASSESSMENT
67 y/o M w/
A/P:66M PMH HTN, DM, asthma (no prior hospitalization or intubations), prostate cancer (s/p surgery, chemo, RT), who presents with a chief complaint of progressive weakness and found to be severely hyperglycemic to 600s.  A1c:18.8%  Wt:80.5kg  Cr:1.53  GFR:50  home regimen: Jentadueto XR 5-1000mg QD
65 y/o M w/
67 y/o M w/
66M PMH HTN, DM, asthma (no prior hospitalization or intubations), prostate cancer (s/p surgery, chemo, RT), who presents with a chief complaint of progressive weakness and found to be severely hyperglycemic to 600s.
66M PMH HTN, DM, asthma (no prior hospitalization or intubations), prostate cancer (s/p surgery, chemo, RT), who presents with a chief complaint of progressive weakness and found to be severely hyperglycemic to 600s.

## 2022-06-29 NOTE — PROGRESS NOTE ADULT - PROBLEM SELECTOR PLAN 3
Presents with Hgb ~11 w/o known hx of anemia.  No signs/symptoms of bleeding.  - iron studies consistent with ACD
holding ACE-I and HCTZ for now
holding ACE-I and HCTZ for now
BP at goal off medications; holding ACE-I and HCTZ for now, until he sees his PCP on 7/5
Presents with Hgb ~11 w/o known hx of anemia.  No signs/symptoms of bleeding.  - iron studies consistent with ACD

## 2022-06-29 NOTE — DISCHARGE NOTE NURSING/CASE MANAGEMENT/SOCIAL WORK - NSDCFUADDAPPT_GEN_ALL_CORE_FT
We recommend diabetes follow up at the following comprehensive diabetes center:    [facility]  Program ID: [id number]  Address: [facility],[address] [place] [address], 2nd floor,  City: [place]  State: [place]  ZIP: [id number]  Phone: [phone]

## 2022-06-29 NOTE — PROGRESS NOTE ADULT - TIME BILLING
Dispo: home pending improved glycemic control
Dispo: home pending improved glycemic control
case complexity
case complexity
Dispo: d/c home today  PCP appointment scheduled 7/5 at 9AM

## 2022-06-29 NOTE — PROGRESS NOTE ADULT - SUBJECTIVE AND OBJECTIVE BOX
INTERVAL HPI/OVERNIGHT EVENTS:      Patient is a 66y old  Male who presents with a chief complaint of hyperglycemia (28 Jun 2022 08:57  Patient feeling better, no longer dizzy.   Blood sugars remain improving. Eating well.    Pt reports the following symptoms:    CONSTITUTIONAL:  Negative fever or chills, feels well, good appetite  EYES:  Negative  blurry vision or double vision  CARDIOVASCULAR:  Negative for chest pain or palpitations  RESPIRATORY:  Negative for cough, wheezing, or SOB   GASTROINTESTINAL:  Negative for nausea, vomiting, diarrhea, constipation, or abdominal pain  GENITOURINARY:  Negative frequency, urgency or dysuria  NEUROLOGIC:  No headache, confusion, dizziness, lightheadedness    Pt reports the following symptoms:    CONSTITUTIONAL:  Negative fever or chills, feels well, good appetite  EYES:  Negative  blurry vision or double vision  CARDIOVASCULAR:  Negative for chest pain or palpitations  RESPIRATORY:  Negative for cough, wheezing, or SOB   GASTROINTESTINAL:  Negative for nausea, vomiting, diarrhea, constipation, or abdominal pain  GENITOURINARY:  Negative frequency, urgency or dysuria  NEUROLOGIC:  No headache, confusion, dizziness, lightheadedness    MEDICATIONS  (STANDING):  dextrose 5%. 1000 milliLiter(s) (50 mL/Hr) IV Continuous <Continuous>  dextrose 5%. 1000 milliLiter(s) (100 mL/Hr) IV Continuous <Continuous>  dextrose 50% Injectable 25 Gram(s) IV Push once  dextrose 50% Injectable 12.5 Gram(s) IV Push once  dextrose 50% Injectable 25 Gram(s) IV Push once  glucagon  Injectable 1 milliGRAM(s) IntraMuscular once  insulin glargine Injectable (LANTUS) 30 Unit(s) SubCutaneous at bedtime  insulin lispro (ADMELOG) corrective regimen sliding scale   SubCutaneous Before meals and at bedtime  insulin lispro Injectable (ADMELOG) 20 Unit(s) SubCutaneous three times a day before meals  potassium phosphate / sodium phosphate Powder (PHOS-NaK) 1 Packet(s) Oral three times a day with meals    MEDICATIONS  (PRN):  acetaminophen     Tablet .. 650 milliGRAM(s) Oral every 6 hours PRN Moderate Pain (4 - 6)  dextrose Oral Gel 15 Gram(s) Oral once PRN Blood Glucose LESS THAN 70 milliGRAM(s)/deciliter      PHYSICAL EXAM  Vital Signs Last 24 Hrs  T(C): 36.7 (29 Jun 2022 13:08), Max: 37 (28 Jun 2022 21:50)  T(F): 98 (29 Jun 2022 13:08), Max: 98.6 (28 Jun 2022 21:50)  HR: 86 (29 Jun 2022 13:08) (74 - 86)  BP: 122/75 (29 Jun 2022 13:08) (114/72 - 122/75)  BP(mean): --  RR: 18 (29 Jun 2022 13:08) (18 - 18)  SpO2: 98% (29 Jun 2022 13:08) (96% - 98%)    Constitutional: NAD.   HEENT: NCAT, MMM, OP clear, EOMI, , no proptosis or lid retraction  Neck: no thyromegaly or palpable thyroid nodules   Respiratory: lungs CTAB.  Cardiovascular: regular rhythm, normal S1 and S2, no audible murmurs, no peripheral edema  GI: soft, NT/ND, no masses/HSM appreciated.  Neurology: no tremors, DTR 2+  Skin: no visible rashes/lesions  Psychiatric: AAO x 3, normal affect/mood.  LABS:                        9.6    4.24  )-----------( 207      ( 29 Jun 2022 05:30 )             28.5     06-29    141  |  111<H>  |  15  ----------------------------<  150<H>  4.0   |  21<L>  |  1.16    Ca    8.3<L>      29 Jun 2022 05:30  Phos  2.4     06-29  Mg     2.1     06-29    TPro  6.3  /  Alb  3.4  /  TBili  0.5  /  DBili  x   /  AST  17  /  ALT  10  /  AlkPhos  62  06-28        Thyroid Stimulating Hormone, Serum: 1.180 uIU/mL (06-26 @ 08:18)      HbA1C:   CAPILLARY BLOOD GLUCOSE      POCT Blood Glucose.: 134 mg/dL (29 Jun 2022 17:06)  POCT Blood Glucose.: 207 mg/dL (29 Jun 2022 12:42)  POCT Blood Glucose.: 176 mg/dL (29 Jun 2022 09:04)  POCT Blood Glucose.: 122 mg/dL (28 Jun 2022 22:00)

## 2022-06-29 NOTE — PROGRESS NOTE ADULT - PROBLEM SELECTOR PLAN 4
Presents with elevated lipase at 252. No abd pain. Do not suspect this is pancreatitis given absence of abd pain, though suspect this is elevated i/s/o his diabetes, hyperglycemia, and some element of pancreatic inflammation with poorly controlled hyperglycemia. Other causes could include reduced clearance i/s/o JUAN vs hepatobiliary disease.   - Lipid panel WNL  - CT A/P with IV cont with no pancreatic mass   - manage underlying diabetes as above  - would repeat lipase when hyperglycemia better controlled and if persistently elevated consider GI consult
cont. albuterol PRN
Presents with elevated lipase at 252. No abd pain. Do not suspect this is pancreatitis given absence of abd pain, though suspect this is elevated i/s/o his diabetes, hyperglycemia, and some element of pancreatic inflammation with poorly controlled hyperglycemia. Other causes could include reduced clearance i/s/o JUAN vs hepatobiliary disease.   - Lipid panel WNL  - CT A/P with IV cont with no pancreatic mass   - manage underlying diabetes as above  - would repeat lipase when hyperglycemia better controlled and if persistently elevated consider GI consult
cont. albuterol PRN
cont. albuterol PRN

## 2022-06-29 NOTE — PROGRESS NOTE ADULT - PROBLEM SELECTOR PLAN 1
HbA1c 18.8%; cont. basal-bolus insulin; monitor blood glucose; f/u Endocrine recs; diabetes educator and Nutrition consults; Pt. will need to learn insulin self-injection
HbA1c 18.8%; cont. basal-bolus insulin; monitor blood glucose; f/u Endocrine recs; diabetes educator and Nutrition consults; Pt. will need to learn insulin self-injection
DM type 2- uncontrolled   check c-peptide level   Please continue lantus 30  units at night.    Please continue lispro  20  units before each meal.   Please continue lispro moderate  dose sliding scale four times daily with meals and at bedtime    Pt's fingerstick glucose goal is 100-180    Will continue to monitor     Patient in need of diabetes education. reviewed insulin pen injection technique and provided written material. Pt showed competency thru return demonstration.    For discharge, patient will need insulin, please have nurses provide insulin teaching    Pt can follow up at discharge with [facility] by calling  to make an appointment.   Will discuss case with Dr. Reed and update primary team
Hx of DM, reportedly with a1c ~7 and blood sugars  at home.  Presents after fatigue, polydipsia, polyuria, and decreased appetite with FSG >600.  On metformin-linagliptin 5mg-1000mg qd at home. A1c 18.8. S/p 10u regular insulin x3 and 4L NS in ED. TSH and lipid panel WNL.   - patient on LR @ 120cc/hr overnight and throughout the day  - Patient underwent CT A/P with IV contrast to r/o pancreatic mass -- no mass indicated at this time, dilated bladder  - endo consulted, recommending lantus 16u STAT, premeal 8u, and mISS  - currently tolerating regular diet  - Will F/U for diabetes education
HbA1c 18.8%; cont. basal-bolus insulin; monitor blood glucose; f/u Endocrine recs; diabetes educator and Nutrition consults; Pt. learned insulin self-injection
Hx of DM, reportedly with a1c ~7 and blood sugars  at home.  Presents after fatigue, polydipsia, polyuria, and decreased appetite with FSG >600.  On metformin-linagliptin 5mg-1000mg qd at home. A1c 18.8. S/p 10u regular insulin x3 and 4L NS in ED. TSH and lipid panel WNL.   - patient on LR @ 120cc/hr overnight and throughout the day  - Patient underwent CT A/P with IV contrast to r/o pancreatic mass -- no mass indicated at this time, dilated bladder  - endo consulted, recommending lantus 16u STAT, premeal 8u, and mISS  - currently tolerating regular diet

## 2022-06-29 NOTE — PROGRESS NOTE ADULT - PROBLEM SELECTOR PROBLEM 1
Uncontrolled type 2 diabetes mellitus with hyperglycemia
Hyperglycemia due to diabetes mellitus
Uncontrolled type 2 diabetes mellitus with hyperglycemia
Uncontrolled type 2 diabetes mellitus with hyperglycemia
Hyperglycemia due to diabetes mellitus
Hyperglycemia due to diabetes mellitus

## 2022-06-29 NOTE — ADVANCED PRACTICE NURSE CONSULT - ASSESSMENT
Knowledge deficit:  Self care  Diabetes patho Knowledge deficit:  Self care  Diabetes patho  Pt set goals:  Will take insulin regimen if rx, will do SBGM fasting and two hours post meals.

## 2022-06-29 NOTE — PROGRESS NOTE ADULT - PROBLEM SELECTOR PLAN 5
repleted
Hx of HTN on lisinopril-hctz 20-25mg qd.  - hold i/s/o low BPs and poor renal function, restart as tolerated
replete
Hx of HTN on lisinopril-hctz 20-25mg qd.  - hold i/s/o low BPs and poor renal function, restart as tolerated
replete

## 2022-06-29 NOTE — PROGRESS NOTE ADULT - PROBLEM SELECTOR PROBLEM 2
JUAN (acute kidney injury)

## 2022-06-29 NOTE — ADVANCED PRACTICE NURSE CONSULT - REASON FOR CONSULT
[facility] contacted by GALA Burgos MD re: pt with A1c 18.8%  "A/P:66M PMH HTN, DM, asthma (no prior hospitalization or intubations), prostate cancer (s/p surgery, chemo, RT), who presents with a chief complaint of progressive weakness and found to be severely hyperglycemic to 600s".

## 2022-06-29 NOTE — PROGRESS NOTE ADULT - NS ATTEND AMEND GEN_ALL_CORE FT
The patient was seen at the bedside and discharge orders were discussed. He will go home on 30 units Lantus Insulin daily and 20 units pre-meal Insulin.

## 2022-06-29 NOTE — ADVANCED PRACTICE NURSE CONSULT - RECOMMEDATIONS
Please rx glucose meter - pt lost his on holiday.   Please see DSME on flow sheets  Please consider referral to DSME program post discharge for ongoing support.    The center below is within the patients home and work zip codes:  [facility]  Program ID: [id number]  Address: [address], [address]nd floor,  City: [place]  [place]: [place]  ZIP: [address]  Phone: [phone]

## 2022-06-29 NOTE — PROGRESS NOTE ADULT - PROBLEM SELECTOR PLAN 6
Hx of prostate cancer s/p prostatectomy, chemo, and RT ([id number]).  - outpatient f/u
Hx of prostate cancer s/p prostatectomy, chemo, and RT ([id number]).  - outpatient f/u
replete
replete
repleted

## 2022-06-29 NOTE — DISCHARGE NOTE NURSING/CASE MANAGEMENT/SOCIAL WORK - PATIENT PORTAL LINK FT
You can access the FollowMyHealth Patient Portal offered by [facility] by registering at the following website: [email or web address]. By joining [employer]’s FollowMyHealth portal, you will also be able to view your health information using other applications (apps) compatible with our system.

## 2022-06-29 NOTE — DISCHARGE NOTE NURSING/CASE MANAGEMENT/SOCIAL WORK - NSDCPEFALRISK_GEN_ALL_CORE
For information on Fall & Injury Prevention, visit: [email or web address] OR  [email or web address] OR  https://www.cdc.gov/steadi/patient.html

## 2022-07-08 PROBLEM — C61 MALIGNANT NEOPLASM OF PROSTATE: Chronic | Status: ACTIVE | Noted: 2022-06-26

## 2022-07-08 PROBLEM — E11.9 TYPE 2 DIABETES MELLITUS WITHOUT COMPLICATIONS: Chronic | Status: ACTIVE | Noted: 2022-06-26

## 2022-07-08 PROBLEM — J45.909 UNSPECIFIED ASTHMA, UNCOMPLICATED: Chronic | Status: ACTIVE | Noted: 2022-06-26

## 2022-07-08 PROBLEM — Z86.79 PERSONAL HISTORY OF OTHER DISEASES OF THE CIRCULATORY SYSTEM: Chronic | Status: ACTIVE | Noted: 2022-06-26

## 2022-07-11 PROBLEM — Z00.00 ENCOUNTER FOR PREVENTIVE HEALTH EXAMINATION: Status: ACTIVE | Noted: 2022-07-11

## 2022-07-22 ENCOUNTER — APPOINTMENT (OUTPATIENT)
Dept: ENDOCRINOLOGY | Facility: CLINIC | Age: 67
End: 2022-07-22

## 2022-07-22 VITALS
DIASTOLIC BLOOD PRESSURE: 87 MMHG | HEIGHT: 70 IN | WEIGHT: 195 LBS | SYSTOLIC BLOOD PRESSURE: 146 MMHG | BODY MASS INDEX: 27.92 KG/M2 | HEART RATE: 78 BPM

## 2022-07-22 DIAGNOSIS — E11.9 TYPE 2 DIABETES MELLITUS W/OUT COMPLICATIONS: ICD-10-CM

## 2022-07-22 PROCEDURE — 99204 OFFICE O/P NEW MOD 45 MIN: CPT

## 2022-07-22 RX ORDER — LISINOPRIL AND HYDROCHLOROTHIAZIDE TABLETS 20; 25 MG/1; MG/1
20-25 TABLET ORAL
Qty: 90 | Refills: 0 | Status: ACTIVE | COMMUNITY
Start: 2021-06-24

## 2022-07-22 RX ORDER — METHYLPHENIDATE HYDROCHLORIDE 5 MG/1
5 TABLET ORAL
Qty: 60 | Refills: 0 | Status: ACTIVE | COMMUNITY
Start: 2022-05-25

## 2022-07-22 RX ORDER — ALBUTEROL SULFATE 90 UG/1
108 (90 BASE) INHALANT RESPIRATORY (INHALATION)
Qty: 8 | Refills: 0 | Status: ACTIVE | COMMUNITY
Start: 2022-06-02

## 2022-07-22 RX ORDER — LANCETS 28 GAUGE
EACH MISCELLANEOUS
Qty: 100 | Refills: 1 | Status: ACTIVE | COMMUNITY
Start: 2022-07-22 | End: 1900-01-01

## 2022-07-22 NOTE — DATA REVIEWED
[FreeTextEntry1] : 6/22  A1c 18.8%, tot chol 112, trig 123, HDL 29, LDL 58, Ca 8.3.  Cr improved from 1.53 to 1.16\par 6/22 ([facility] portal)  A1c > 15%, Cr 3.76, tot chol 162, HDL 35, trig 324, LDL 88, ferritin 949, TSH 1.33

## 2022-07-22 NOTE — REASON FOR VISIT
[Initial Evaluation] : an initial evaluation [DM Type 2] : DM Type 2 [FreeTextEntry2] : Dr Carlos Puga

## 2022-07-22 NOTE — PHYSICAL EXAM
[Alert] : alert [No Acute Distress] : no acute distress [No Proptosis] : no proptosis [No Lid Lag] : no lid lag [Normal Hearing] : hearing was normal [No LAD] : no lymphadenopathy [Thyroid Not Enlarged] : the thyroid was not enlarged [Clear to Auscultation] : lungs were clear to auscultation bilaterally [Normal S1, S2] : normal S1 and S2 [Regular Rhythm] : with a regular rhythm [Pedal Pulses Normal] : the pedal pulses are present [Normal Sensation on Monofilament Testing] : normal sensation on monofilament testing of lower extremities [Normal Affect] : the affect was normal [Normal Mood] : the mood was normal [Foot Ulcers] : no foot ulcers [de-identified] : 1+ pedal edema [de-identified] : no onychomycoses, mild acanthosis nigricans

## 2022-07-22 NOTE — ASSESSMENT
[FreeTextEntry1] : Diabetes, recently glucose toxic but now glucose toxicity has resolved and he is having hypoglycemia \par I recommended reducing Lantus to 20 units hs and adding Trulicity 0.75mg/week; discontinue lispro insulin (but he can continue this , 5 units with meals until he is able to get Trulicity)\par restart metformin 500mg bid (1000mg dose was giving him flatulence); renal function is back to normal\par Test once/day, alternating between am and post meal.  glucose goals reviewed:  am goal < 130,  2h post meal goal < 180.   \par RD referral given\par RTO 3 months

## 2022-08-01 RX ORDER — PEN NEEDLE, DIABETIC 29 G X1/2"
31G X 5 MM NEEDLE, DISPOSABLE MISCELLANEOUS
Qty: 100 | Refills: 1 | Status: ACTIVE | COMMUNITY
Start: 2022-08-01 | End: 1900-01-01

## 2022-08-25 ENCOUNTER — RX RENEWAL (OUTPATIENT)
Age: 67
End: 2022-08-25

## 2022-08-31 ENCOUNTER — APPOINTMENT (OUTPATIENT)
Dept: ENDOCRINOLOGY | Facility: CLINIC | Age: 67
End: 2022-08-31

## 2022-09-20 ENCOUNTER — APPOINTMENT (OUTPATIENT)
Dept: ENDOCRINOLOGY | Facility: CLINIC | Age: 67
End: 2022-09-20

## 2022-09-28 ENCOUNTER — APPOINTMENT (OUTPATIENT)
Dept: ENDOCRINOLOGY | Facility: CLINIC | Age: 67
End: 2022-09-28

## 2022-10-24 ENCOUNTER — APPOINTMENT (OUTPATIENT)
Dept: ENDOCRINOLOGY | Facility: CLINIC | Age: 67
End: 2022-10-24

## 2022-10-24 VITALS
DIASTOLIC BLOOD PRESSURE: 91 MMHG | WEIGHT: 194 LBS | SYSTOLIC BLOOD PRESSURE: 159 MMHG | BODY MASS INDEX: 27.84 KG/M2 | HEART RATE: 90 BPM

## 2022-10-24 DIAGNOSIS — Z23 ENCOUNTER FOR IMMUNIZATION: ICD-10-CM

## 2022-10-24 LAB
GLUCOSE BLDC GLUCOMTR-MCNC: 159
HBA1C MFR BLD HPLC: 6.9

## 2022-10-24 PROCEDURE — 36415 COLL VENOUS BLD VENIPUNCTURE: CPT

## 2022-10-24 PROCEDURE — 82962 GLUCOSE BLOOD TEST: CPT

## 2022-10-24 PROCEDURE — 83036 HEMOGLOBIN GLYCOSYLATED A1C: CPT | Mod: NC,QW

## 2022-10-24 PROCEDURE — 99214 OFFICE O/P EST MOD 30 MIN: CPT | Mod: 25

## 2022-10-24 PROCEDURE — 90662 IIV NO PRSV INCREASED AG IM: CPT

## 2022-10-24 PROCEDURE — G0008: CPT

## 2022-10-24 RX ORDER — INSULIN GLARGINE 100 [IU]/ML
100 INJECTION, SOLUTION SUBCUTANEOUS
Qty: 1 | Refills: 1 | Status: DISCONTINUED | COMMUNITY
Start: 2022-06-29 | End: 2022-10-24

## 2022-10-24 RX ORDER — CLOBETASOL PROPIONATE 0.5 MG/G
0.05 OINTMENT TOPICAL
Qty: 15 | Refills: 0 | Status: ACTIVE | COMMUNITY
Start: 2022-08-22

## 2022-10-24 RX ORDER — UREA 40 G/100G
40 CREAM TOPICAL
Qty: 198 | Refills: 0 | Status: ACTIVE | COMMUNITY
Start: 2022-08-22

## 2022-10-24 NOTE — DATA REVIEWED
[FreeTextEntry1] : 10/22  A1c 6.9% POC\par 6/22  A1c 18.8%, tot chol 112, trig 123, HDL 29, LDL 58, Ca 8.3.  Cr improved from 1.53 to 1.16\par 6/22 ([facility] portal)  A1c > 15%, Cr 3.76, tot chol 162, HDL 35, trig 324, LDL 88, ferritin 949, TSH 1.33

## 2022-10-24 NOTE — ASSESSMENT
[FreeTextEntry1] : Diabetes, sugars greatly improved.\par Titrate Trulicity to 1.5mg/week.   Samples given for 0.75mg/week, to restart (since he's been off for two weeks), and then increase to 1.5mg/week in two weeks.  After two weeks on 1.5mg/week dose, discontinue glargine insulin.  continue metformin.\par recommended increasing physical activity.  Exercise will help sugars from spiking after meals.\par urine albumin today\par ophtho recommended\par flu vaccine given, L deltoid\par RTO 3-4 months

## 2022-10-24 NOTE — HISTORY OF PRESENT ILLNESS
[FreeTextEntry1] : Not able to take Trulicity for the past 2 weeks due to shortage/backorder\par sugars ranging 106-160 mostly.  today was 159.\par Tolerating Trulicity.  has some reflux but thinks it is from drinking 2 cups of coffee/day to keep alert at work and on subway\par Neuropathy in toes is 90% better.  Able to walk a longer/farther distance\par but isn't exercising much\par vision is back to normal; he hasn't seen ophtho yet\par metformin not upsetting his stomach, but he is afraid of flatulence\par had Covid infection a few weeks ago\par \par PMH  diabetes \par prostate cancer 2019\par \par Meds\par glargine 20 units/day\par Trulicity 0.75mg/week, metformin 500mg bid\par lisinopril HCTZ \par Previous meds:  Jentadueto XR\par

## 2022-10-25 LAB
CREAT SPEC-SCNC: 59 MG/DL
MICROALBUMIN 24H UR DL<=1MG/L-MCNC: <1.2 MG/DL
MICROALBUMIN/CREAT 24H UR-RTO: NORMAL MG/G

## 2022-11-30 ENCOUNTER — APPOINTMENT (OUTPATIENT)
Dept: ENDOCRINOLOGY | Facility: CLINIC | Age: 67
End: 2022-11-30

## 2022-12-13 ENCOUNTER — APPOINTMENT (OUTPATIENT)
Dept: ENDOCRINOLOGY | Facility: CLINIC | Age: 67
End: 2022-12-13

## 2023-01-03 NOTE — CONSULT NOTE ADULT - CONSULT REQUESTED DATE/TIME
Chief Complaint   Patient presents with   • Hypothyroidism        HPI   Mitra García is a 50 y.o. female had concerns including Hypothyroidism.      Patient reports that she has been feeling more irritable and fatigued since last visit. She is also having more issues with constipation.  She completed labs prior to appointment which were reviewed today.  She is currently taking levothyroxine 50 mcg daily and liothyronine 5 mcg daily.  She denies routine missed doses.    The following portions of the patient's history were reviewed and updated as appropriate: allergies, current medications and past social history.    Review of Systems   Constitutional: Positive for fatigue.   Gastrointestinal: Positive for constipation. Negative for abdominal pain.      /78 (BP Location: Left arm, Patient Position: Sitting, Cuff Size: Adult)   Pulse 60   Ht 177.8 cm (70\")   Wt 83.9 kg (185 lb)   SpO2 94%   BMI 26.54 kg/m²      Physical Exam      Constitutional:  well developed; well nourished  no acute distress   ENT/Thyroid: not examined   Eyes: Conjunctiva: clear   Respiratory:  breathing is unlabored  clear to auscultation bilaterally   Cardiovascular:  regular rate and rhythm   Chest:  Not performed.   Abdomen: soft, non-tender; no masses   : Not performed.   Musculoskeletal: Not performed   Skin: not performed.   Neuro: mental status, speech normal   Psych: mood and affect are within normal limits       Labs/Imaging  Labs dated 12/30/2022  TSH 5.92  Free T4 1.2    Diagnoses and all orders for this visit:    1. Hypothyroidism, unspecified type  -     liothyronine (CYTOMEL) 5 MCG tablet; Take 2 tablets by mouth Daily. E03.9  Dispense: 180 tablet; Refill: 1  -     levothyroxine (SYNTHROID, LEVOTHROID) 50 MCG tablet; Take 1 tablet by mouth Daily.  Dispense: 90 tablet; Refill: 1  Most recent labs with mildly elevated TSH  Patient with symptomatic concerns including fatigue, constipation  Discussed options for 
26-Jun-2022 13:11
medication adjustments, patient opted to increase levothyroxine.  Patient to increase levothyroxine to 10 mcg daily, continue levothyroxine 50 mcg daily.  Patient will contact the clinic in the interim between visits with any concerning symptomatic changes.     Return in about 6 months (around 7/3/2023). The patient was instructed to contact the clinic with any interval questions or concerns.    Chelo Colmenares MD   Endocrinologist    Dictated Utilizing Dragon Dictation   
27-Jun-2022 14:48

## 2023-01-25 ENCOUNTER — APPOINTMENT (OUTPATIENT)
Dept: ENDOCRINOLOGY | Facility: CLINIC | Age: 68
End: 2023-01-25

## 2023-02-13 ENCOUNTER — APPOINTMENT (OUTPATIENT)
Dept: ENDOCRINOLOGY | Facility: CLINIC | Age: 68
End: 2023-02-13
Payer: COMMERCIAL

## 2023-02-13 VITALS
BODY MASS INDEX: 27.26 KG/M2 | SYSTOLIC BLOOD PRESSURE: 131 MMHG | HEART RATE: 88 BPM | WEIGHT: 190 LBS | DIASTOLIC BLOOD PRESSURE: 80 MMHG

## 2023-02-13 PROCEDURE — 99214 OFFICE O/P EST MOD 30 MIN: CPT

## 2023-02-13 RX ORDER — DULAGLUTIDE 1.5 MG/.5ML
1.5 INJECTION, SOLUTION SUBCUTANEOUS
Qty: 4 | Refills: 2 | Status: DISCONTINUED | COMMUNITY
Start: 2022-07-22 | End: 2023-02-13

## 2023-02-13 NOTE — PHYSICAL EXAM
[Alert] : alert [No Acute Distress] : no acute distress [No Proptosis] : no proptosis [No Lid Lag] : no lid lag [Normal Hearing] : hearing was normal [No LAD] : no lymphadenopathy [Thyroid Not Enlarged] : the thyroid was not enlarged [Clear to Auscultation] : lungs were clear to auscultation bilaterally [Normal S1, S2] : normal S1 and S2 [Regular Rhythm] : with a regular rhythm [No Edema] : no peripheral edema [Pedal Pulses Normal] : the pedal pulses are present [Normal Affect] : the affect was normal [Normal Mood] : the mood was normal [Acanthosis Nigricans] : no acanthosis nigricans [Foot Ulcers] : no foot ulcers [de-identified] : (+) callous

## 2023-02-13 NOTE — HISTORY OF PRESENT ILLNESS
[FreeTextEntry1] : mid- December to mid-January is a "high holiday period" with the holidays and multiple birthdays occurring and he calls it a "cheat month"\par Freestyle meter review:  14d avg 189 (n12).  am readings:  231 (after Super Bowl), 166, 189, 171, 150, 192, 189, 213, 172, 185\par He ran out of metformin recently and is currrently taking some old Jentadueto\par no polyuria, polydipsia, SOB (other than usual asthma symptoms) or chest pain\par some tingling in toes which is more noticeable when he's off his feet.\par areola areas more sensitive\par still has not seen ophtho\par trying to walk more. He walks his dog 2x/day\par labs reviewed from [employer] portal:  A1c 7.4%\par \par PMH  diabetes \par prostate cancer 2019\par \par Meds\par Trulicity 1.5mg/week, metformin 500mg bid\par lisinopril HCTZ \par Previous meds:  Jentadueto XR, glargine (stopped due to good glucose control)\par

## 2023-02-13 NOTE — DATA REVIEWED
[FreeTextEntry1] : 2/23. A1c 7.4%, tot chol 166, HDL 51, trig 58, , GFR 61\par 10/22  A1c 6.9% POC, urine alb < 1.2\par 6/22  A1c 18.8%, tot chol 112, trig 123, HDL 29, LDL 58, Ca 8.3.  Cr improved from 1.53 to 1.16\par 6/22 ([facility] portal)  A1c > 15%, Cr 3.76, tot chol 162, HDL 35, trig 324, LDL 88, ferritin 949, TSH 1.33

## 2023-02-13 NOTE — ASSESSMENT
[FreeTextEntry1] : Diabetes, sugars increasing, due to dietary indiscretion.\par Change Trulicity to Mounjaro 2.5mg/week.  I am also adding glimepiride 1mg with first meal, but he can discontinue glimepiride once am sugars are below 110.  He can also use glimepiride on a prn basis, on days he knows he will eat more (vacation, holidays, if he is prescribed steroids).\par continue metformin, will titrate dose to 850mg bid\par ophtho recommended\par \par RTO 4 months

## 2023-03-03 ENCOUNTER — APPOINTMENT (OUTPATIENT)
Dept: ENDOCRINOLOGY | Facility: CLINIC | Age: 68
End: 2023-03-03
Payer: COMMERCIAL

## 2023-03-03 PROCEDURE — G0108 DIAB MANAGE TRN  PER INDIV: CPT

## 2023-04-28 ENCOUNTER — APPOINTMENT (OUTPATIENT)
Dept: ENDOCRINOLOGY | Facility: CLINIC | Age: 68
End: 2023-04-28

## 2023-06-26 ENCOUNTER — APPOINTMENT (OUTPATIENT)
Dept: ENDOCRINOLOGY | Facility: CLINIC | Age: 68
End: 2023-06-26

## 2023-07-13 ENCOUNTER — RX RENEWAL (OUTPATIENT)
Age: 68
End: 2023-07-13

## 2023-07-31 NOTE — PATIENT PROFILE ADULT - NSPROMEDSADMININFO_GEN_A_NUR
Form completed and sent to Physician's Office electronically via In Basket messaging for review and signature.     Completed form will be Faxed to Kev at [phone] and emailed to [email or web address],sent to [facility] and mailed to patient   no concerns

## 2024-01-04 NOTE — HISTORY OF PRESENT ILLNESS
[FreeTextEntry1] : Diabetes diagnosed about 5 years ago, controlled with metformin and then earlier this year, it was changed to Jentadueto.\par He was feeling fine and admits maybe he got a little careless with his diet.\par He went to [place] for vacation, was eating gelato "for breakfast" and pasta (which he normally doesn't eat), and then he has syncopal episode a few days before coming home (he was sitting and then fainted on ground) and then another episode at the airport (bent over to get his water bottle and fainted).\par When he got back to [place], saw his PCP, labs were done and then pt was advised to go to ED.\par In hospital, A1c was 18.8%.   He was discharged on glargine and lispro insulin regimen but now he is having hypoglycemia every day.\par Freestyle meter:  14d avg 72 (n6), 30d  134 (n13).   37 (today am), 36 (1p), 43 (9a), 38 (3p), 43 (4p), 239 (after juice), 33 (7p), 175 (3a).  He is having hypoglycemia symptoms (shaking, sweating)\par Vision was blurry when he was in the hospital, but not while in [place].\par He had been having neuropathy symptoms in feet but now symptoms are less intense.\par he is up to date with ophtho.  Last visit was last fall and ophtho was "ecstatic" with his pictures.  he has appointment for next week.\par Maternal uncle and paternal great aunt have diabetes (no first degree relatives with diabetes)\par \par PMH  diabetes \par prostate cancer 2019\par \par Meds\par glargine 30 units/day\par lispro 20 units with meals TID\par lisinopril HCTZ \par Previous meds:  Jentadueto XR\par 
This section is complete and the patient is ready for discharge

## 2024-04-03 ENCOUNTER — APPOINTMENT (OUTPATIENT)
Dept: ENDOCRINOLOGY | Facility: CLINIC | Age: 69
End: 2024-04-03
Payer: COMMERCIAL

## 2024-04-03 VITALS
BODY MASS INDEX: 26.83 KG/M2 | HEART RATE: 93 BPM | WEIGHT: 187 LBS | SYSTOLIC BLOOD PRESSURE: 160 MMHG | DIASTOLIC BLOOD PRESSURE: 89 MMHG

## 2024-04-03 LAB
GLUCOSE BLDC GLUCOMTR-MCNC: 126
HBA1C MFR BLD HPLC: 9.1

## 2024-04-03 PROCEDURE — 83036 HEMOGLOBIN GLYCOSYLATED A1C: CPT | Mod: QW

## 2024-04-03 PROCEDURE — 82962 GLUCOSE BLOOD TEST: CPT

## 2024-04-03 PROCEDURE — 99214 OFFICE O/P EST MOD 30 MIN: CPT

## 2024-04-03 PROCEDURE — G2211 COMPLEX E/M VISIT ADD ON: CPT

## 2024-04-03 RX ORDER — METFORMIN HYDROCHLORIDE 850 MG/1
850 TABLET, COATED ORAL TWICE DAILY
Qty: 180 | Refills: 0 | Status: DISCONTINUED | COMMUNITY
Start: 2022-07-22 | End: 2024-04-03

## 2024-04-03 RX ORDER — SILDENAFIL 20 MG/1
20 TABLET ORAL
Qty: 180 | Refills: 0 | Status: ACTIVE | COMMUNITY
Start: 2021-11-08

## 2024-04-03 RX ORDER — BLOOD SUGAR DIAGNOSTIC
STRIP MISCELLANEOUS
Qty: 1 | Refills: 1 | Status: ACTIVE | COMMUNITY
Start: 2022-07-22 | End: 1900-01-01

## 2024-04-03 RX ORDER — SILDENAFIL 100 MG/1
100 TABLET, FILM COATED ORAL
Qty: 30 | Refills: 5 | Status: ACTIVE | COMMUNITY
Start: 2024-04-03 | End: 1900-01-01

## 2024-04-03 NOTE — ASSESSMENT
[FreeTextEntry1] : Diabetes, not controlled, probably due to a combination of dietary indiscretion and lack of medication. Titrate Mounjaro  5mg/week.  Change metformin to ER version, 750mg, try to titrate to 2 tab/day dose. Continue glimepiride 1mg until A1c is back at goal, and then he can resume taking it on a prn basis (when eating out). Increased physical activity recommended. Goal 6000 steps/day for walking. due for urine albumin ophtho recommended  RTO 4 months

## 2024-04-03 NOTE — DATA REVIEWED
[FreeTextEntry1] : 4/24  A1c 9.1% 7/23  A1c 6.7%,  tot chol 172, trig 73, HDL 57, LDL 99, TSH 0.80, 24D 40, B12 920 2/23. A1c 7.4%, tot chol 166, HDL 51, trig 58, , GFR 61 10/22  A1c 6.9% POC, urine alb < 1.2 6/22  A1c 18.8%, tot chol 112, trig 123, HDL 29, LDL 58, Ca 8.3.  Cr improved from 1.53 to 1.16 6/22 ([facility])  A1c > 15%, Cr 3.76, tot chol 162, HDL 35, trig 324, LDL 88, ferritin 949, TSH 1.33

## 2024-04-03 NOTE — HISTORY OF PRESENT ILLNESS
[FreeTextEntry1] : Last here Feb 2023 He has not been taking Mounjaro for past 2-3 weeks due to backorder at pharmacy.   It is always difficult to get. He is taking metformin only once/day due to increased BM (7-8/day) when he takes it BID. He is taking glimepiride occasionally, when he eats out. He has not been testing glucose regularly at home.  He reports range of  in the mornings but does not have the meter here. He admits that his diet is not good--a lot of meat and rice. He still has not seen ophtho  He walks his dog 2x/day (his main exercise) In October, he had hematuria, was dx'd with UTI and rx'd antibiotics by PCP.  He saw urologist who ordered CT scan and told him he did not have UTI and that hematuria may be result of previous prostate Ca treatment.  But pt went to [place] and had urinary frequency so he took the antiobiotic (Cipro) and felt better. Some SOB due to asthma and allergies.  No chest pain. Tingling in toes is less than before. labs reviewed from [facility] portal:  A1c 6.7% last June.  LDL 99,  TSH 0.80. Today, A1c is 9.1%!  [facility]  diabetes  prostate cancer 2019  Meds Mounjaro.2.5mg/week,  has been without for 3 weeks metformin 850mg bid, taking once/day only lisinopril HCTZ  Previous meds:  Jentadueto XR, glargine (stopped due to good glucose control), Trulicity (changed to Mounjaro)

## 2024-04-03 NOTE — PHYSICAL EXAM
[Alert] : alert [No Acute Distress] : no acute distress [No Proptosis] : no proptosis [No Lid Lag] : no lid lag [Normal Hearing] : hearing was normal [No LAD] : no lymphadenopathy [Thyroid Not Enlarged] : the thyroid was not enlarged [Clear to Auscultation] : lungs were clear to auscultation bilaterally [Normal S1, S2] : normal S1 and S2 [Regular Rhythm] : with a regular rhythm [No Edema] : no peripheral edema [Pedal Pulses Normal] : the pedal pulses are present [Acanthosis Nigricans] : no acanthosis nigricans [Foot Ulcers] : no foot ulcers [Normal Sensation on Monofilament Testing] : normal sensation on monofilament testing of lower extremities [Normal Affect] : the affect was normal [Normal Mood] : the mood was normal [de-identified] : glucose 126, piece of cheddar [de-identified] : (+) callous

## 2024-04-04 LAB
CREAT SPEC-SCNC: 178 MG/DL
MICROALBUMIN 24H UR DL<=1MG/L-MCNC: 2.3 MG/DL
MICROALBUMIN/CREAT 24H UR-RTO: 13 MG/G

## 2024-05-14 ENCOUNTER — NON-APPOINTMENT (OUTPATIENT)
Age: 69
End: 2024-05-14

## 2024-08-16 ENCOUNTER — APPOINTMENT (OUTPATIENT)
Dept: ENDOCRINOLOGY | Facility: CLINIC | Age: 69
End: 2024-08-16
Payer: COMMERCIAL

## 2024-08-16 VITALS
BODY MASS INDEX: 26.05 KG/M2 | WEIGHT: 182 LBS | HEART RATE: 96 BPM | DIASTOLIC BLOOD PRESSURE: 92 MMHG | SYSTOLIC BLOOD PRESSURE: 159 MMHG | HEIGHT: 70 IN

## 2024-08-16 LAB
GLUCOSE BLDC GLUCOMTR-MCNC: 107
HBA1C MFR BLD HPLC: 6.8

## 2024-08-16 PROCEDURE — 99214 OFFICE O/P EST MOD 30 MIN: CPT

## 2024-08-16 PROCEDURE — 82962 GLUCOSE BLOOD TEST: CPT

## 2024-08-16 PROCEDURE — G2211 COMPLEX E/M VISIT ADD ON: CPT

## 2024-08-16 PROCEDURE — 83036 HEMOGLOBIN GLYCOSYLATED A1C: CPT | Mod: QW

## 2024-08-16 NOTE — PHYSICAL EXAM
[Alert] : alert [No Acute Distress] : no acute distress [Acanthosis Nigricans] : no acanthosis nigricans [Normal Affect] : the affect was normal [Normal Mood] : the mood was normal [de-identified] : glucose 107

## 2024-08-16 NOTE — DATA REVIEWED
[FreeTextEntry1] :  8/24 A1c 6.8% 4/24  A1c 9.1%, urine alb/cr 13 7/23  A1c 6.7%,  tot chol 172, trig 73, HDL 57, LDL 99, TSH 0.80, 24D 40, B12 920 2/23. A1c 7.4%, tot chol 166, HDL 51, trig 58, , GFR 61 10/22  A1c 6.9% POC, urine alb < 1.2 6/22  A1c 18.8%, tot chol 112, trig 123, HDL 29, LDL 58, Ca 8.3.  Cr improved from 1.53 to 1.16 6/22 ([facility])  A1c > 15%, Cr 3.76, tot chol 162, HDL 35, trig 324, LDL 88, ferritin 949, TSH 1.33

## 2024-08-16 NOTE — ASSESSMENT
[FreeTextEntry1] : Diabetes, back at goal. Continue Mounjaro  5mg/week. Discontinue glimepiride since sugars are now trending low.   But he should keep these around (don't throw away) to take in case he has trouble getting Mounjaro again. Reduce  metformin  mg  to 1 tab/day since he is having diarrhea Increased physical activity recommended, try to increase to 4x/week  RTO 4 -6months

## 2024-08-16 NOTE — HISTORY OF PRESENT ILLNESS
[FreeTextEntry1] : Freestyle meter (date not set).   30d avg 101. 121, 70, 98, 118, 156, 159, 123, 95, 68 (in morning). He did not have hypoglycemia symptoms when glucose was 68 or 70. Metformin still gives him explosive diarrhea, but he takes it b/c he wants his sugars to be controlled.  The ER formulation is only a little bit better than regular metformin. It took two months before he could get Mounjaro, so he restarted Mounjaro in late June.  At first, he didn't feel hungry at all and was eating only one meal a day.  But now appetite is back to normal. weight is 5 lb less than last visit in April.  he saw ophtho, no changes in the past year Today, A1c is 6.8%, improved.  [facility]  diabetes  prostate cancer 2019  Meds Mounjaro. 5mg/week metformin  ER 750mg bid, glimepiride 1mg/day lisinopril HCTZ  Previous meds:  Jentadueto XR, glargine (stopped due to good glucose control), Trulicity (changed to Mounjaro)

## 2025-01-08 ENCOUNTER — NON-APPOINTMENT (OUTPATIENT)
Age: 70
End: 2025-01-08

## 2025-01-08 ENCOUNTER — APPOINTMENT (OUTPATIENT)
Dept: ENDOCRINOLOGY | Facility: CLINIC | Age: 70
End: 2025-01-08
Payer: COMMERCIAL

## 2025-01-08 ENCOUNTER — RESULT CHARGE (OUTPATIENT)
Age: 70
End: 2025-01-08

## 2025-01-08 VITALS
DIASTOLIC BLOOD PRESSURE: 71 MMHG | HEART RATE: 109 BPM | WEIGHT: 176 LBS | SYSTOLIC BLOOD PRESSURE: 155 MMHG | BODY MASS INDEX: 25.25 KG/M2

## 2025-01-08 LAB
GLUCOSE BLDC GLUCOMTR-MCNC: 140
HBA1C MFR BLD HPLC: 5.9

## 2025-01-08 PROCEDURE — 36415 COLL VENOUS BLD VENIPUNCTURE: CPT

## 2025-01-08 PROCEDURE — 82962 GLUCOSE BLOOD TEST: CPT

## 2025-01-08 PROCEDURE — 99214 OFFICE O/P EST MOD 30 MIN: CPT

## 2025-01-08 PROCEDURE — 83036 HEMOGLOBIN GLYCOSYLATED A1C: CPT | Mod: QW

## 2025-01-08 RX ORDER — TIRZEPATIDE 5 MG/.5ML
5 INJECTION, SOLUTION SUBCUTANEOUS
Qty: 4 | Refills: 5 | Status: ACTIVE | COMMUNITY
Start: 2025-01-08 | End: 1900-01-01

## 2025-01-08 RX ORDER — GLIMEPIRIDE 1 MG/1
1 TABLET ORAL DAILY
Qty: 30 | Refills: 3 | Status: ACTIVE | COMMUNITY
Start: 2025-01-08 | End: 1900-01-01

## 2025-01-09 LAB
ALBUMIN SERPL ELPH-MCNC: 5 G/DL
ALP BLD-CCNC: 74 U/L
ALT SERPL-CCNC: 42 U/L
ANION GAP SERPL CALC-SCNC: 19 MMOL/L
AST SERPL-CCNC: 55 U/L
BILIRUB SERPL-MCNC: 2.4 MG/DL
BUN SERPL-MCNC: 30 MG/DL
CALCIUM SERPL-MCNC: 10.4 MG/DL
CHLORIDE SERPL-SCNC: 96 MMOL/L
CHOLEST SERPL-MCNC: 206 MG/DL
CO2 SERPL-SCNC: 21 MMOL/L
CREAT SERPL-MCNC: 1.61 MG/DL
EGFR: 47 ML/MIN/1.73M2
GLUCOSE SERPL-MCNC: 122 MG/DL
HDLC SERPL-MCNC: 85 MG/DL
LDLC SERPL CALC-MCNC: 104 MG/DL
NONHDLC SERPL-MCNC: 121 MG/DL
POTASSIUM SERPL-SCNC: 4.9 MMOL/L
PROT SERPL-MCNC: 8.3 G/DL
SODIUM SERPL-SCNC: 137 MMOL/L
TRIGL SERPL-MCNC: 97 MG/DL
TSH SERPL-ACNC: 1.18 UIU/ML
VIT B12 SERPL-MCNC: 1062 PG/ML

## 2025-04-01 ENCOUNTER — RX RENEWAL (OUTPATIENT)
Age: 70
End: 2025-04-01

## 2025-06-17 ENCOUNTER — RX RENEWAL (OUTPATIENT)
Age: 70
End: 2025-06-17

## 2025-06-17 RX ORDER — BLOOD SUGAR DIAGNOSTIC
STRIP MISCELLANEOUS
Qty: 50 | Refills: 0 | Status: ACTIVE | COMMUNITY
Start: 2025-06-17 | End: 1900-01-01

## 2025-07-11 ENCOUNTER — APPOINTMENT (OUTPATIENT)
Dept: ENDOCRINOLOGY | Facility: CLINIC | Age: 70
End: 2025-07-11
Payer: COMMERCIAL

## 2025-07-11 VITALS
WEIGHT: 180 LBS | BODY MASS INDEX: 25.77 KG/M2 | SYSTOLIC BLOOD PRESSURE: 135 MMHG | HEART RATE: 86 BPM | DIASTOLIC BLOOD PRESSURE: 83 MMHG | HEIGHT: 70 IN

## 2025-07-11 LAB — GLUCOSE BLDC GLUCOMTR-MCNC: 98

## 2025-07-11 PROCEDURE — 99213 OFFICE O/P EST LOW 20 MIN: CPT | Mod: 25

## 2025-07-11 PROCEDURE — 82962 GLUCOSE BLOOD TEST: CPT

## 2025-07-11 RX ORDER — REPAGLINIDE 1 MG/1
1 TABLET ORAL
Qty: 90 | Refills: 1 | Status: ACTIVE | COMMUNITY
Start: 2025-07-11 | End: 1900-01-01